# Patient Record
Sex: FEMALE | Race: BLACK OR AFRICAN AMERICAN | NOT HISPANIC OR LATINO | Employment: OTHER | URBAN - METROPOLITAN AREA
[De-identification: names, ages, dates, MRNs, and addresses within clinical notes are randomized per-mention and may not be internally consistent; named-entity substitution may affect disease eponyms.]

---

## 2023-05-05 ENCOUNTER — APPOINTMENT (EMERGENCY)
Dept: CT IMAGING | Facility: HOSPITAL | Age: 73
DRG: 641 | End: 2023-05-05
Payer: COMMERCIAL

## 2023-05-05 ENCOUNTER — APPOINTMENT (EMERGENCY)
Dept: RADIOLOGY | Facility: HOSPITAL | Age: 73
DRG: 641 | End: 2023-05-05
Payer: COMMERCIAL

## 2023-05-05 ENCOUNTER — HOSPITAL ENCOUNTER (INPATIENT)
Facility: HOSPITAL | Age: 73
LOS: 5 days | Discharge: HOME HEALTH | DRG: 641 | End: 2023-05-10
Attending: EMERGENCY MEDICINE | Admitting: STUDENT IN AN ORGANIZED HEALTH CARE EDUCATION/TRAINING PROGRAM
Payer: COMMERCIAL

## 2023-05-05 DIAGNOSIS — N18.9 ACUTE KIDNEY INJURY SUPERIMPOSED ON CHRONIC KIDNEY DISEASE (HCC): Primary | ICD-10-CM

## 2023-05-05 DIAGNOSIS — E87.5 HYPERKALEMIA: ICD-10-CM
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
## 2023-05-05 DIAGNOSIS — N17.9 ACUTE KIDNEY INJURY SUPERIMPOSED ON CHRONIC KIDNEY DISEASE (HCC): Primary | ICD-10-CM
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
## 2023-05-05 DIAGNOSIS — R26.2 AMBULATORY DYSFUNCTION: ICD-10-CM

## 2023-05-05 PROBLEM — E11.9 DM (DIABETES MELLITUS) (HCC): Status: ACTIVE | Noted: 2023-05-05

## 2023-05-05 PROBLEM — L73.2 HIDRADENITIS: Status: ACTIVE | Noted: 2023-05-05

## 2023-05-05 PROBLEM — N61.0 MASTITIS: Status: ACTIVE | Noted: 2023-05-05

## 2023-05-05 PROBLEM — D64.9 ANEMIA: Status: ACTIVE | Noted: 2023-05-05

## 2023-05-05 LAB
ALBUMIN SERPL BCP-MCNC: 3.5 G/DL (ref 3.5–5)
ALP SERPL-CCNC: 108 U/L (ref 34–104)
ALT SERPL W P-5'-P-CCNC: 6 U/L (ref 7–52)
ANION GAP SERPL CALCULATED.3IONS-SCNC: 4 MMOL/L (ref 4–13)
ANION GAP SERPL CALCULATED.3IONS-SCNC: 4 MMOL/L (ref 4–13)
AST SERPL W P-5'-P-CCNC: 8 U/L (ref 13–39)
ATRIAL RATE: 78 BPM
BASOPHILS # BLD AUTO: 0.05 THOUSANDS/ÂΜL (ref 0–0.1)
BASOPHILS NFR BLD AUTO: 0 % (ref 0–1)
BILIRUB DIRECT SERPL-MCNC: 0 MG/DL (ref 0–0.2)
BILIRUB SERPL-MCNC: 0.28 MG/DL (ref 0.2–1)
BUN SERPL-MCNC: 34 MG/DL (ref 5–25)
BUN SERPL-MCNC: 36 MG/DL (ref 5–25)
CALCIUM SERPL-MCNC: 9.2 MG/DL (ref 8.4–10.2)
CALCIUM SERPL-MCNC: 9.2 MG/DL (ref 8.4–10.2)
CARDIAC TROPONIN I PNL SERPL HS: <2 NG/L
CHLORIDE SERPL-SCNC: 107 MMOL/L (ref 96–108)
CHLORIDE SERPL-SCNC: 109 MMOL/L (ref 96–108)
CO2 SERPL-SCNC: 23 MMOL/L (ref 21–32)
CO2 SERPL-SCNC: 24 MMOL/L (ref 21–32)
CREAT SERPL-MCNC: 2.28 MG/DL (ref 0.6–1.3)
CREAT SERPL-MCNC: 2.65 MG/DL (ref 0.6–1.3)
EOSINOPHIL # BLD AUTO: 1.03 THOUSAND/ÂΜL (ref 0–0.61)
EOSINOPHIL NFR BLD AUTO: 8 % (ref 0–6)
ERYTHROCYTE [DISTWIDTH] IN BLOOD BY AUTOMATED COUNT: 16.6 % (ref 11.6–15.1)
GFR SERPL CREATININE-BSD FRML MDRD: 17 ML/MIN/1.73SQ M
GFR SERPL CREATININE-BSD FRML MDRD: 21 ML/MIN/1.73SQ M
GLUCOSE SERPL-MCNC: 78 MG/DL (ref 65–140)
GLUCOSE SERPL-MCNC: 87 MG/DL (ref 65–140)
GLUCOSE SERPL-MCNC: 89 MG/DL (ref 65–140)
HCT VFR BLD AUTO: 35.1 % (ref 34.8–46.1)
HGB BLD-MCNC: 10.8 G/DL (ref 11.5–15.4)
IMM GRANULOCYTES # BLD AUTO: 0.03 THOUSAND/UL (ref 0–0.2)
IMM GRANULOCYTES NFR BLD AUTO: 0 % (ref 0–2)
LYMPHOCYTES # BLD AUTO: 1.36 THOUSANDS/ÂΜL (ref 0.6–4.47)
LYMPHOCYTES NFR BLD AUTO: 11 % (ref 14–44)
MAGNESIUM SERPL-MCNC: 2.3 MG/DL (ref 1.9–2.7)
MCH RBC QN AUTO: 25.4 PG (ref 26.8–34.3)
MCHC RBC AUTO-ENTMCNC: 30.8 G/DL (ref 31.4–37.4)
MCV RBC AUTO: 83 FL (ref 82–98)
MONOCYTES # BLD AUTO: 0.82 THOUSAND/ÂΜL (ref 0.17–1.22)
MONOCYTES NFR BLD AUTO: 7 % (ref 4–12)
NEUTROPHILS # BLD AUTO: 9.06 THOUSANDS/ÂΜL (ref 1.85–7.62)
NEUTS SEG NFR BLD AUTO: 74 % (ref 43–75)
NRBC BLD AUTO-RTO: 0 /100 WBCS
P AXIS: 63 DEGREES
PLATELET # BLD AUTO: 328 THOUSANDS/UL (ref 149–390)
PMV BLD AUTO: 10.2 FL (ref 8.9–12.7)
POTASSIUM SERPL-SCNC: 5.4 MMOL/L (ref 3.5–5.3)
POTASSIUM SERPL-SCNC: 5.6 MMOL/L (ref 3.5–5.3)
PR INTERVAL: 158 MS
PROT SERPL-MCNC: 8.1 G/DL (ref 6.4–8.4)
QRS AXIS: 5 DEGREES
QRSD INTERVAL: 76 MS
QT INTERVAL: 356 MS
QTC INTERVAL: 405 MS
RBC # BLD AUTO: 4.25 MILLION/UL (ref 3.81–5.12)
SODIUM SERPL-SCNC: 135 MMOL/L (ref 135–147)
SODIUM SERPL-SCNC: 136 MMOL/L (ref 135–147)
T WAVE AXIS: 83 DEGREES
TSH SERPL DL<=0.05 MIU/L-ACNC: 5.87 UIU/ML (ref 0.45–4.5)
VENTRICULAR RATE: 78 BPM
WBC # BLD AUTO: 12.35 THOUSAND/UL (ref 4.31–10.16)

## 2023-05-05 PROCEDURE — 82948 REAGENT STRIP/BLOOD GLUCOSE: CPT

## 2023-05-05 PROCEDURE — 80076 HEPATIC FUNCTION PANEL: CPT | Performed by: EMERGENCY MEDICINE

## 2023-05-05 PROCEDURE — 83735 ASSAY OF MAGNESIUM: CPT | Performed by: EMERGENCY MEDICINE

## 2023-05-05 PROCEDURE — 99285 EMERGENCY DEPT VISIT HI MDM: CPT | Performed by: EMERGENCY MEDICINE

## 2023-05-05 PROCEDURE — G1004 CDSM NDSC: HCPCS

## 2023-05-05 PROCEDURE — 85025 COMPLETE CBC W/AUTO DIFF WBC: CPT | Performed by: EMERGENCY MEDICINE

## 2023-05-05 PROCEDURE — 84443 ASSAY THYROID STIM HORMONE: CPT | Performed by: EMERGENCY MEDICINE

## 2023-05-05 PROCEDURE — 70450 CT HEAD/BRAIN W/O DYE: CPT

## 2023-05-05 PROCEDURE — 80048 BASIC METABOLIC PNL TOTAL CA: CPT | Performed by: STUDENT IN AN ORGANIZED HEALTH CARE EDUCATION/TRAINING PROGRAM

## 2023-05-05 PROCEDURE — 36415 COLL VENOUS BLD VENIPUNCTURE: CPT | Performed by: EMERGENCY MEDICINE

## 2023-05-05 PROCEDURE — 1124F ACP DISCUSS-NO DSCNMKR DOCD: CPT | Performed by: INTERNAL MEDICINE

## 2023-05-05 PROCEDURE — 99285 EMERGENCY DEPT VISIT HI MDM: CPT

## 2023-05-05 PROCEDURE — 80048 BASIC METABOLIC PNL TOTAL CA: CPT | Performed by: EMERGENCY MEDICINE

## 2023-05-05 PROCEDURE — 93005 ELECTROCARDIOGRAM TRACING: CPT

## 2023-05-05 PROCEDURE — 93010 ELECTROCARDIOGRAM REPORT: CPT | Performed by: INTERNAL MEDICINE

## 2023-05-05 PROCEDURE — 84484 ASSAY OF TROPONIN QUANT: CPT | Performed by: EMERGENCY MEDICINE

## 2023-05-05 PROCEDURE — 99223 1ST HOSP IP/OBS HIGH 75: CPT | Performed by: STUDENT IN AN ORGANIZED HEALTH CARE EDUCATION/TRAINING PROGRAM

## 2023-05-05 PROCEDURE — 96365 THER/PROPH/DIAG IV INF INIT: CPT

## 2023-05-05 PROCEDURE — 71046 X-RAY EXAM CHEST 2 VIEWS: CPT

## 2023-05-05 RX ORDER — DULOXETIN HYDROCHLORIDE 60 MG/1
60 CAPSULE, DELAYED RELEASE ORAL DAILY
COMMUNITY

## 2023-05-05 RX ORDER — OMEPRAZOLE 20 MG/1
20 CAPSULE, DELAYED RELEASE ORAL DAILY
COMMUNITY

## 2023-05-05 RX ORDER — DULOXETIN HYDROCHLORIDE 60 MG/1
60 CAPSULE, DELAYED RELEASE ORAL DAILY
Status: DISCONTINUED | OUTPATIENT
Start: 2023-05-06 | End: 2023-05-10 | Stop reason: HOSPADM

## 2023-05-05 RX ORDER — INSULIN LISPRO 100 [IU]/ML
1-5 INJECTION, SOLUTION INTRAVENOUS; SUBCUTANEOUS
Status: DISCONTINUED | OUTPATIENT
Start: 2023-05-05 | End: 2023-05-10 | Stop reason: HOSPADM

## 2023-05-05 RX ORDER — AMITRIPTYLINE HYDROCHLORIDE 25 MG/1
25 TABLET, FILM COATED ORAL
COMMUNITY

## 2023-05-05 RX ORDER — TIMOLOL MALEATE 5 MG/ML
1 SOLUTION/ DROPS OPHTHALMIC 2 TIMES DAILY
Status: DISCONTINUED | OUTPATIENT
Start: 2023-05-05 | End: 2023-05-10 | Stop reason: HOSPADM

## 2023-05-05 RX ORDER — SULFAMETHOXAZOLE AND TRIMETHOPRIM 800; 160 MG/1; MG/1
1 TABLET ORAL EVERY 12 HOURS SCHEDULED
COMMUNITY
End: 2023-05-10

## 2023-05-05 RX ORDER — LATANOPROST 50 UG/ML
1 SOLUTION/ DROPS OPHTHALMIC 2 TIMES DAILY
Status: DISCONTINUED | OUTPATIENT
Start: 2023-05-05 | End: 2023-05-10 | Stop reason: HOSPADM

## 2023-05-05 RX ORDER — SODIUM CHLORIDE 9 MG/ML
100 INJECTION, SOLUTION INTRAVENOUS CONTINUOUS
Status: DISCONTINUED | OUTPATIENT
Start: 2023-05-05 | End: 2023-05-06

## 2023-05-05 RX ORDER — TRAMADOL HYDROCHLORIDE 50 MG/1
50 TABLET ORAL EVERY 6 HOURS PRN
Status: DISCONTINUED | OUTPATIENT
Start: 2023-05-05 | End: 2023-05-06

## 2023-05-05 RX ORDER — TRAMADOL HYDROCHLORIDE 50 MG/1
50 TABLET ORAL EVERY 6 HOURS PRN
COMMUNITY

## 2023-05-05 RX ORDER — SODIUM POLYSTYRENE SULFONATE 4.1 MEQ/G
15 POWDER, FOR SUSPENSION ORAL; RECTAL ONCE
Status: COMPLETED | OUTPATIENT
Start: 2023-05-05 | End: 2023-05-05

## 2023-05-05 RX ORDER — ALBUTEROL SULFATE 90 UG/1
2 AEROSOL, METERED RESPIRATORY (INHALATION) EVERY 6 HOURS PRN
COMMUNITY

## 2023-05-05 RX ORDER — ASPIRIN 81 MG/1
81 TABLET, CHEWABLE ORAL DAILY
COMMUNITY

## 2023-05-05 RX ORDER — ACETAMINOPHEN 325 MG/1
650 TABLET ORAL EVERY 6 HOURS PRN
Status: DISCONTINUED | OUTPATIENT
Start: 2023-05-05 | End: 2023-05-10 | Stop reason: HOSPADM

## 2023-05-05 RX ORDER — GABAPENTIN 600 MG/1
600 TABLET ORAL 3 TIMES DAILY
COMMUNITY

## 2023-05-05 RX ORDER — HEPARIN SODIUM 5000 [USP'U]/ML
5000 INJECTION, SOLUTION INTRAVENOUS; SUBCUTANEOUS EVERY 8 HOURS SCHEDULED
Status: DISCONTINUED | OUTPATIENT
Start: 2023-05-05 | End: 2023-05-10 | Stop reason: HOSPADM

## 2023-05-05 RX ORDER — ALBUTEROL SULFATE 90 UG/1
2 AEROSOL, METERED RESPIRATORY (INHALATION) EVERY 6 HOURS PRN
Status: DISCONTINUED | OUTPATIENT
Start: 2023-05-05 | End: 2023-05-10 | Stop reason: HOSPADM

## 2023-05-05 RX ORDER — OXYCODONE HYDROCHLORIDE 5 MG/1
5 TABLET ORAL EVERY 6 HOURS PRN
Status: DISCONTINUED | OUTPATIENT
Start: 2023-05-05 | End: 2023-05-06

## 2023-05-05 RX ORDER — FLUTICASONE PROPIONATE 50 MCG
1 SPRAY, SUSPENSION (ML) NASAL DAILY
COMMUNITY

## 2023-05-05 RX ORDER — FUROSEMIDE 20 MG/1
20 TABLET ORAL 2 TIMES DAILY
COMMUNITY

## 2023-05-05 RX ORDER — ASPIRIN 81 MG/1
81 TABLET, CHEWABLE ORAL DAILY
Status: DISCONTINUED | OUTPATIENT
Start: 2023-05-06 | End: 2023-05-10 | Stop reason: HOSPADM

## 2023-05-05 RX ORDER — PANTOPRAZOLE SODIUM 40 MG/1
40 TABLET, DELAYED RELEASE ORAL
Status: DISCONTINUED | OUTPATIENT
Start: 2023-05-06 | End: 2023-05-10 | Stop reason: HOSPADM

## 2023-05-05 RX ORDER — AMITRIPTYLINE HYDROCHLORIDE 25 MG/1
25 TABLET, FILM COATED ORAL
Status: DISCONTINUED | OUTPATIENT
Start: 2023-05-05 | End: 2023-05-10 | Stop reason: HOSPADM

## 2023-05-05 RX ORDER — LATANOPROST 50 UG/ML
1 SOLUTION/ DROPS OPHTHALMIC
COMMUNITY

## 2023-05-05 RX ORDER — AMLODIPINE BESYLATE 10 MG/1
10 TABLET ORAL DAILY
COMMUNITY

## 2023-05-05 RX ORDER — ENALAPRIL MALEATE 10 MG/1
10 TABLET ORAL DAILY
COMMUNITY
End: 2023-05-10

## 2023-05-05 RX ADMIN — ACETAMINOPHEN 650 MG: 325 TABLET ORAL at 22:12

## 2023-05-05 RX ADMIN — HEPARIN SODIUM 5000 UNITS: 5000 INJECTION INTRAVENOUS; SUBCUTANEOUS at 16:59

## 2023-05-05 RX ADMIN — HEPARIN SODIUM 5000 UNITS: 5000 INJECTION INTRAVENOUS; SUBCUTANEOUS at 21:35

## 2023-05-05 RX ADMIN — TRAMADOL HYDROCHLORIDE 50 MG: 50 TABLET, COATED ORAL at 16:59

## 2023-05-05 RX ADMIN — SODIUM CHLORIDE, SODIUM LACTATE, POTASSIUM CHLORIDE, AND CALCIUM CHLORIDE 1000 ML: .6; .31; .03; .02 INJECTION, SOLUTION INTRAVENOUS at 14:11

## 2023-05-05 RX ADMIN — SODIUM CHLORIDE 100 ML/HR: 0.9 INJECTION, SOLUTION INTRAVENOUS at 15:30

## 2023-05-05 RX ADMIN — SODIUM POLYSTYRENE SULFONATE 15 G: 1 POWDER ORAL; RECTAL at 22:14

## 2023-05-05 RX ADMIN — TIMOLOL MALEATE 1 DROP: 5 SOLUTION/ DROPS OPHTHALMIC at 21:35

## 2023-05-05 RX ADMIN — LATANOPROST 1 DROP: 50 SOLUTION OPHTHALMIC at 21:36

## 2023-05-05 NOTE — PLAN OF CARE
Problem: Potential for Falls  Goal: Patient will remain free of falls  Description: INTERVENTIONS:  - Educate patient/family on patient safety including physical limitations  - Instruct patient to call for assistance with activity   - Consult OT/PT to assist with strengthening/mobility   - Keep Call bell within reach  - Keep bed low and locked with side rails adjusted as appropriate  - Keep care items and personal belongings within reach  - Initiate and maintain comfort rounds  - Make Fall Risk Sign visible to staff  - Offer Toileting every 3 Hours, in advance of need  - Initiate/Maintain bed alarm  - Obtain necessary fall risk management equipment:   - Apply yellow socks and bracelet for high fall risk patients  - Consider moving patient to room near nurses station  5/5/2023 1808 by Santiago Chowdhury  Outcome: Progressing  5/5/2023 1808 by Santiago Chowdhury  Outcome: Progressing     Problem: MOBILITY - ADULT  Goal: Maintain or return to baseline ADL function  Description: INTERVENTIONS:  -  Assess patient's ability to carry out ADLs; assess patient's baseline for ADL function and identify physical deficits which impact ability to perform ADLs (bathing, care of mouth/teeth, toileting, grooming, dressing, etc )  - Assess/evaluate cause of self-care deficits   - Assess range of motion  - Assess patient's mobility; develop plan if impaired  - Assess patient's need for assistive devices and provide as appropriate  - Encourage maximum independence but intervene and supervise when necessary  - Involve family in performance of ADLs  - Assess for home care needs following discharge   - Consider OT consult to assist with ADL evaluation and planning for discharge  - Provide patient education as appropriate  5/5/2023 1808 by Santiago Chowdhury  Outcome: Progressing  5/5/2023 1808 by Santiago Chowdhury  Outcome: Progressing  Goal: Maintains/Returns to pre admission functional level  Description: INTERVENTIONS:  - Perform BMAT or MOVE assessment daily    - Set and communicate daily mobility goal to care team and patient/family/caregiver  - Collaborate with rehabilitation services on mobility goals if consulted  - Perform Range of Motion 3 times a day  - Reposition patient every 3 hours    - Dangle patient 3 times a day  - Stand patient 3 times a day  - Ambulate patient 3 times a day  - Out of bed to chair 3 times a day   - Out of bed for meals 3 times a day  - Out of bed for toileting  - Record patient progress and toleration of activity level   5/5/2023 1808 by Xavier Manriquez  Outcome: Progressing  5/5/2023 1808 by Xavier Manriquez  Outcome: Progressing     Problem: PAIN - ADULT  Goal: Verbalizes/displays adequate comfort level or baseline comfort level  Description: Interventions:  - Encourage patient to monitor pain and request assistance  - Assess pain using appropriate pain scale  - Administer analgesics based on type and severity of pain and evaluate response  - Implement non-pharmacological measures as appropriate and evaluate response  - Consider cultural and social influences on pain and pain management  - Notify physician/advanced practitioner if interventions unsuccessful or patient reports new pain  Outcome: Progressing     Problem: INFECTION - ADULT  Goal: Absence or prevention of progression during hospitalization  Description: INTERVENTIONS:  - Assess and monitor for signs and symptoms of infection  - Monitor lab/diagnostic results  - Monitor all insertion sites, i e  indwelling lines, tubes, and drains  - Monitor endotracheal if appropriate and nasal secretions for changes in amount and color  - [place] appropriate cooling/warming therapies per order  - Administer medications as ordered  - Instruct and encourage patient and family to use good hand hygiene technique  - Identify and instruct in appropriate isolation precautions for identified infection/condition  Outcome: Progressing  Goal: Absence of fever/infection during neutropenic period  Description: INTERVENTIONS:  - Monitor WBC    Outcome: Progressing     Problem: SAFETY ADULT  Goal: Patient will remain free of falls  Description: INTERVENTIONS:  - Educate patient/family on patient safety including physical limitations  - Instruct patient to call for assistance with activity   - Consult OT/PT to assist with strengthening/mobility   - Keep Call bell within reach  - Keep bed low and locked with side rails adjusted as appropriate  - Keep care items and personal belongings within reach  - Initiate and maintain comfort rounds  - Make Fall Risk Sign visible to staff  - Offer Toileting every 3 Hours, in advance of need  - Initiate/Maintain bed alarm  - Obtain necessary fall risk management equipment  - Apply yellow socks and bracelet for high fall risk patients  - Consider moving patient to room near nurses station  5/5/2023 1808 by Nabil Rodriguez  Outcome: Progressing  5/5/2023 1808 by Nabil Rodriguez  Outcome: Progressing  Goal: Maintain or return to baseline ADL function  Description: INTERVENTIONS:  -  Assess patient's ability to carry out ADLs; assess patient's baseline for ADL function and identify physical deficits which impact ability to perform ADLs (bathing, care of mouth/teeth, toileting, grooming, dressing, etc )  - Assess/evaluate cause of self-care deficits   - Assess range of motion  - Assess patient's mobility; develop plan if impaired  - Assess patient's need for assistive devices and provide as appropriate  - Encourage maximum independence but intervene and supervise when necessary  - Involve family in performance of ADLs  - Assess for home care needs following discharge   - Consider OT consult to assist with ADL evaluation and planning for discharge  - Provide patient education as appropriate  5/5/2023 1808 by Nabil Rodriguez  Outcome: Progressing  5/5/2023 1808 by Nabil Rodriguez  Outcome: Progressing  Goal: Maintains/Returns to pre admission functional level  Description: INTERVENTIONS:  - Perform BMAT or MOVE assessment daily    - Set and communicate daily mobility goal to care team and patient/family/caregiver  - Collaborate with rehabilitation services on mobility goals if consulted  - Perform Range of Motion 3 times a day  - Reposition patient every 3 hours  - Dangle patient 3 times a day  - Stand patient 3 times a day  - Ambulate patient 3 times a day  - Out of bed to chair 3 times a day   - Out of bed for meals 3 times a day  - Out of bed for toileting  - Record patient progress and toleration of activity level   5/5/2023 1808 by Jerald Dickens  Outcome: Progressing  5/5/2023 1808 by Jerald Dickens  Outcome: Progressing     Problem: DISCHARGE PLANNING  Goal: Discharge to home or other facility with appropriate resources  Description: INTERVENTIONS:  - Identify barriers to discharge w/patient and caregiver  - Arrange for needed discharge resources and transportation as appropriate  - Identify discharge learning needs (meds, wound care, etc )  - Arrange for interpretive services to assist at discharge as needed  - Refer to Case Management Department for coordinating discharge planning if the patient needs post-hospital services based on physician/advanced practitioner order or complex needs related to functional status, cognitive ability, or social support system  Outcome: Progressing     Problem: Knowledge Deficit  Goal: Patient/family/caregiver demonstrates understanding of disease process, treatment plan, medications, and discharge instructions  Description: Complete learning assessment and assess knowledge base    Interventions:  - Provide teaching at level of understanding  - Provide teaching via preferred learning methods  Outcome: Progressing

## 2023-05-05 NOTE — ASSESSMENT & PLAN NOTE
Lab Results   Component Value Date    HGBA1C 6 1 (H) 03/23/2023       No results for input(s): POCGLU in the last 72 hours  Blood Sugar Average: Last 72 hrs:     Present on admission history of diabetes  Most recent A1c 6 1  Patient is on once weekly Ozempic  Patient is requesting for regular diet and not diabetic diet since her A1c is controlled which is not unreasonable  Continue with Accu-Cheks monitoring and signs of coverage

## 2023-05-05 NOTE — Clinical Note
Case was discussed with Dr Shruthi Degroot and the patient's admission status was agreed to be Admission Status: observation status to the service of Dr Shruthi Degroot

## 2023-05-05 NOTE — ASSESSMENT & PLAN NOTE
Noted with anemia w/o signs of active overt bleeding repeat   EGD in 01/2023 noted with hiatal hernia, nonobstructive Schatzki's ring, erosive gastritis  Colonoscopy 01/2023 report noted with diverticulosis, small 2 to 5 mm polyp was removed at the time  Current hemoglobin stable 10 8  Follow-up with iron panel

## 2023-05-05 NOTE — ASSESSMENT & PLAN NOTE
Patient has history of left breast hidradenitis  Patient was started on Bactrim by PCP on 05/01/2023  Presented with acute kidney injury, hyperkalemia likely due to Bactrim  Examined with female chaperone ( Nurse Tyra at bedside)  Noted no signs of infection  Stop Bactrim  Follow-up with wound care consult

## 2023-05-05 NOTE — ASSESSMENT & PLAN NOTE
Reportedly patient had 2 falls most recent fall was yesterday  Patient reports few days ago she was in the bathroom and had lost her balance and of having a fall and hit her head against a doorknob  She also fell out of bed yesterday  Denies loss of consciousness  Patient does report having abdomen episodes of slurred speech  Current encounter patient appears comfortable nondistressed  Her speech is normal at this time during my encounter  Does report having difficulty with her memory lately  No gross focal motor deficit on my exam   Fall precaution  TSH elevated: Patient is not on any medications    F/u with repeat TSH in AM  Follow-up vitamin D level, A93 level, folic acid level  PT OT lee ann

## 2023-05-05 NOTE — ED PROVIDER NOTES
"History  Chief Complaint   Patient presents with   • Fall     Per pts daughter pt fell yesterday and hit her head on a door knob  Pt again fell out of the bed but denies injury or hitting her head  (+) Asprin  Pts daughter also states her mother has had slurred speech and has been having trouble finding her words and has been off balance  Daughter states she believes this started after pt began taking \"gabapentin\"  HPI    Prior to Admission Medications   Prescriptions Last Dose Informant Patient Reported? Taking?    DULoxetine (CYMBALTA) 60 mg delayed release capsule 2023  Yes Yes   Sig: Take 60 mg by mouth daily   albuterol (PROVENTIL HFA,VENTOLIN HFA) 90 mcg/act inhaler 2023  Yes Yes   Sig: Inhale 2 puffs every 6 (six) hours as needed for wheezing   amLODIPine (NORVASC) 10 mg tablet 2023  Yes Yes   Sig: Take 10 mg by mouth daily   amitriptyline (ELAVIL) 25 mg tablet 2023  Yes Yes   Sig: Take 25 mg by mouth daily at bedtime   aspirin 81 mg chewable tablet 2023  Yes Yes   Sig: Chew 81 mg daily   enalapril (VASOTEC) 10 mg tablet 2023  Yes Yes   Sig: Take 10 mg by mouth daily   fluticasone (FLONASE) 50 mcg/act nasal spray 2023  Yes Yes   Si spray into each nostril daily   furosemide (LASIX) 20 mg tablet 2023  Yes Yes   Sig: Take 20 mg by mouth 2 (two) times a day   gabapentin (NEURONTIN) 600 MG tablet 2023  Yes Yes   Sig: Take 600 mg by mouth 3 (three) times a day   latanoprost (XALATAN) 0 005 % ophthalmic solution 2023  Yes Yes   Si drop daily at bedtime   omeprazole (PriLOSEC) 20 mg delayed release capsule 2023  Yes Yes   Sig: Take 20 mg by mouth daily   sulfamethoxazole-trimethoprim (BACTRIM DS) 800-160 mg per tablet 2023  Yes Yes   Sig: Take 1 tablet by mouth every 12 (twelve) hours   timolol (BETIMOL) 0 5 % ophthalmic solution 2023  Yes Yes   Si drop 2 (two) times a day   traMADol (ULTRAM) 50 mg tablet 2023  Yes Yes   Sig: Take 50 mg by mouth " every 6 (six) hours as needed for moderate pain      Facility-Administered Medications: None       Past Medical History:   Diagnosis Date   • Diabetes ([facility] )    • Hypertension    • Sciatica    • Stage 4 chronic kidney disease ([facility] )        History reviewed  No pertinent surgical history  History reviewed  No pertinent family history  I have reviewed and agree with the history as documented  E-Cigarette/Vaping   • E-Cigarette Use Never User      E-Cigarette/Vaping Substances   • Nicotine No    • THC No    • CBD No    • Flavoring No    • Other No    • Unknown No      Social History     Tobacco Use   • Smoking status: Never   • Smokeless tobacco: Never   Vaping Use   • Vaping Use: Never used   Substance Use Topics   • Alcohol use: Not Currently   • Drug use: Yes     Types: Marijuana       Review of Systems    Physical Exam  Physical Exam  Vitals and nursing note reviewed  Constitutional:       General: She is not in acute distress  Appearance: She is well-developed  She is morbidly obese  HENT:      Head: Normocephalic and atraumatic  No raccoon eyes, Ledesma's sign, abrasion, contusion or laceration  Eyes:      Conjunctiva/sclera: Conjunctivae normal       Pupils: Pupils are equal, round, and reactive to light  Neck:      Trachea: No tracheal deviation  Cardiovascular:      Rate and Rhythm: Normal rate and regular rhythm  Heart sounds: Normal heart sounds  Pulmonary:      Effort: Pulmonary effort is normal  No respiratory distress  Breath sounds: Normal breath sounds  Chest:      Chest wall: No tenderness  Abdominal:      General: There is no distension  Palpations: Abdomen is soft  Tenderness: There is no abdominal tenderness  Musculoskeletal:         General: No swelling, tenderness, deformity or signs of injury  Cervical back: Normal range of motion  No spinous process tenderness or muscular tenderness  Normal range of motion        Right lower le+ Pitting Edema present  Left lower le+ Pitting Edema present  Skin:     General: Skin is warm and dry  Neurological:      General: No focal deficit present  Mental Status: She is alert and oriented to person, place, and time  GCS: GCS eye subscore is 4  GCS verbal subscore is 5  GCS motor subscore is 6  Cranial Nerves: No dysarthria or facial asymmetry  Sensory: Sensation is intact  Motor: Weakness (mild, generalized) and tremor (occasionally noted to bilateral arms at rest) present        Coordination: Finger-Nose-Finger Test and Heel to [facility] Test normal    Psychiatric:         Behavior: Behavior normal          Vital Signs  ED Triage Vitals   Temperature Pulse Respirations Blood Pressure SpO2   23 1222 23 1222 23 1222 23 1222 23 1222   98 8 °F (37 1 °C) 94 19 144/81 97 %      Temp Source Heart Rate Source Patient Position - Orthostatic VS BP Location FiO2 (%)   23 1222 23 1222 23 1222 23 1222 --   Oral Monitor Sitting Left arm       Pain Score       23 1553       10 - Worst Possible Pain           Vitals:    23 0306 23 0738 23 1501 23 1511   BP:  139/76 91/55 101/58   Pulse: 86  71 62   Patient Position - Orthostatic VS:             Visual Acuity      ED Medications  Medications   lactated ringers bolus 1,000 mL (1,000 mL Intravenous New Bag 23 1411)       Diagnostic Studies  Results Reviewed     Procedure Component Value Units Date/Time    UA w Reflex to Microscopic w Reflex to Culture [[id number]]     Lab Status: No result Specimen: Urine     TSH [[id number]]  (Abnormal) Collected: 23 125    Lab Status: Final result Specimen: Blood from Arm, Right Updated: 23 1350     TSH 3RD GENERATON 5 873 uIU/mL     Basic metabolic panel [[id number]]  (Abnormal) Collected: 23 125    Lab Status: Final result Specimen: Blood from Arm, Right Updated: 23 1348     Sodium 135 mmol/L      Potassium 5 6 mmol/L      Chloride 107 mmol/L      CO2 24 mmol/L      ANION GAP 4 mmol/L      BUN 36 mg/dL      Creatinine 2 65 mg/dL      Glucose 87 mg/dL      Calcium 9 2 mg/dL      eGFR 17 ml/min/1 73sq m     Narrative:      National Kid[employer] Disease Foundation guidelines for Chronic Kidney Disease (CKD):   •  Stage 1 with normal or high GFR (GFR > 90 mL/min/1 73 square meters)  •  Stage 2 Mild CKD (GFR = 60-89 mL/min/1 73 square meters)  •  Stage 3A Moderate CKD (GFR = 45-59 mL/min/1 73 square meters)  •  Stage 3B Moderate CKD (GFR = 30-44 mL/min/1 73 square meters)  •  Stage 4 Severe CKD (GFR = 15-29 mL/min/1 73 square meters)  •  Stage 5 End Stage CKD (GFR <15 mL/min/1 73 square meters)  Note: GFR calculation is accurate only with a steady state creatinine    Hepatic function panel [[id number]]  (Abnormal) Collected: 05/05/23 1255    Lab Status: Final result Specimen: Blood from Arm, Right Updated: 05/05/23 1348     Total Bilirubin 0 28 mg/dL      Bilirubin, Direct 0 00 mg/dL      Alkaline Phosphatase 108 U/L      AST 8 U/L      ALT 6 U/L      Total Protein 8 1 g/dL      Albumin 3 5 g/dL     Magnesium [[id number]]  (Normal) Collected: 05/05/23 1255    Lab Status: Final result Specimen: Blood from Arm, Right Updated: 05/05/23 1348     Magnesium 2 3 mg/dL     HS Troponin 0hr (reflex protocol) [[id number]]  (Normal) Collected: 05/05/23 1258    Lab Status: Final result Specimen: Blood from Arm, Right Updated: 05/05/23 1342     hs TnI 0hr <2 ng/L     CBC and differential [[id number]]  (Abnormal) Collected: 05/05/23 1255    Lab Status: Final result Specimen: Blood from Arm, Right Updated: 05/05/23 1306     WBC 12 35 Thousand/uL      RBC 4 25 Million/uL      Hemoglobin 10 8 g/dL      Hematocrit 35 1 %      MCV 83 fL      MCH 25 4 pg      MCHC 30 8 g/dL      RDW 16 6 %      MPV 10 2 fL      Platelets 969 Thousands/uL      nRBC 0 /100 WBCs      Neutrophils Relative 74 %      Immat GRANS % 0 %      Lymphocytes Relative 11 %      Monocytes Relative 7 %      Eosinophils Relative 8 %      Basophils Relative 0 %      Neutrophils Absolute 9 06 Thousands/µL      Immature Grans Absolute 0 03 Thousand/uL      Lymphocytes Absolute 1 36 Thousands/µL      Monocytes Absolute 0 82 Thousand/µL      Eosinophils Absolute 1 03 Thousand/µL      Basophils Absolute 0 05 Thousands/µL                  XR chest 2 views   Final Result by Declan Lockhart MD (05/05 1451)      No acute cardiopulmonary disease  Workstation performed: [id number]         CT head without contrast   Final Result by Rachell Correa MD (05/05 0102)      No acute intracranial process  No skull fracture  Chronic microangiopathy  Workstation performed: [id number]                    Procedures  ECG 12 Lead Documentation Only    Date/Time: 5/5/2023 1:03 PM  Performed by: Brooks De La O MD  Authorized by: Brooks De La O MD     Indications / Diagnosis:  Confusion, tremors  ECG reviewed by me, the ED Provider: yes    Patient location:  ED  Previous ECG:     Previous ECG:  Unavailable  Interpretation:     Interpretation: abnormal    Rate:     ECG rate:  78    ECG rate assessment: normal    Rhythm:     Rhythm: sinus rhythm    Ectopy:     Ectopy: none    QRS:     QRS axis:  Normal    QRS intervals:  Normal  Conduction:     Conduction: normal    ST segments:     ST segments:  Normal  T waves:     T waves: flattening      Flattening:  I, II, aVL, aVF, V3 and V4             ED Course                               SBIRT 22yo+    Flowsheet Row Most Recent Value   Initial Alcohol Screen: US AUDIT-C     1  How often do you have a drink containing alcohol? 0 Filed at: 05/05/2023 1316   2  How many drinks containing alcohol do you have on a typical day you are drinking? 0 Filed at: 05/05/2023 1316   3b  FEMALE Any Age, or MALE 65+: How often do you have 4 or more drinks on one occassion?  0 Filed at: 05/05/2023 1316   Audit-C Score 0 Filed at: 05/05/2023 1316   CRISTOBAL: How many times in the past year have you    Used an illegal drug or used a prescription medication for non-medical reasons? Never Filed at: 05/05/2023 1316                    Medical Decision Making  This is a 47-year-old female who presents here today for evaluation after a fall  She says yesterday she was sitting on her shower stool and towards the end of her shower felt her legs were asleep  She was able to get out of the shower okay, however when reaching up to  her towel lost her balance and fell over  She says she hit her head on a door handle  She denies loss of consciousness  She has mild headache at the site of impact  She takes baby aspirin, but no other blood thinning medications  She says later she was applying the stepstool with her left foot to try and get into her bed when she started having some tremoring of her left leg, lost her balance, and fell over backwards  She denies any injuries from that fall  She says for several weeks she has been having tremors, starting in her arms and progressing to her legs  She says it is bilateral, still worse in the arms than the legs, somewhat intermittent, particularly in her legs  She denies any focal weakness or numbness  She denies any headaches  She does endorse problems with feeling like she is having some slurred speech at times, as well as short-term memory loss and word finding issues  She says this has been waxing and waning for several weeks  She attributes onset of memory issues and tremors to starting gabapentin  She says she was on this years ago and does not feel like it was helpful  She has not had any recent dose adjustments  She says it was started on a single dose and not titrated up by her doctor  She was started on tramadol for pain related to flareup of hidradenitis on her breast and chronic pain issues  She was also started on an antibiotic    She denies "any medication changes or over-the-counter medication use  She denies fevers, URI symptoms, nausea, vomiting, diarrhea, chest pain or palpitations  She does feel like she has been \"wheezing\" recently and was given albuterol by her PCP  She denies shortness of breath or known history of COPD or asthma  She has a remote history of smoking  From review of her chart, she was prescribed gabapentin on 2/8/2023, to take 600 mg once daily  There is a phone note from 4/3/2023 where she called with shaking of hands, feeling off balance, and some speech issues ongoing since February    ROS: Otherwise negative, unless stated as above  She is well appearing, in no acute distress  She has no external signs of trauma on exam  She has mild generalized weakness, but no focal deficits  She is noted to have intermittent tremors of her bilateral upper extremities, but more noticeable at rest, and less with use  She has no noticeable tremor of her bilateral legs  She has 1+ BLE edema  Exam is otherwise unremarkable  We will get a CT scan of her head to evaluate for intracranial hemorrhage in the setting of her two falls with head trauma  She has no other areas of pain or signs of trauma on exam to warrant additional imaging at this time  She blames her balance issues on the medications, and temporally this could be true  However, her symptoms could also be due to dehydration, LEOLA, electrolyte or thyroid abnormalities, ACS, dysrhythmia, less likely infection  We will check lab work and CXR to evaluate  CT scan will also show a large mass or space occupying lesion that could be contributing to neurologic symptoms  CT scan shows no acute abnormalities  CXR was reviewed by myself, and is unremarkable  Cr today is 2 65  Her prior baseline seems to be in the 1 6-1 7 range, though was 1 95 on 3/23  She has a mild hyperkalemia of 5 6 with no EKG changes  Mild leukocytosis and anemia are similar to prior   Labs are otherwise " unremarkable  She will be admitted to the hospital for further management of her LEOLA  I updated the patient, as well as her daughter via phone, on findings and plan of care, and she expresses understanding  Acute kidney injury superimposed on chronic kidney disease ([facility] ): acute illness or injury  Fall in home, initial encounter: acute illness or injury  Hyperkalemia: acute illness or injury  Amount and/or Complexity of Data Reviewed  External Data Reviewed: labs and notes  Labs: ordered  Decision-making details documented in ED Course  Radiology: ordered and independent interpretation performed  Decision-making details documented in ED Course  ECG/medicine tests: ordered and independent interpretation performed  Decision-making details documented in ED Course  Risk  Decision regarding hospitalization  Disposition  Final diagnoses:   Acute kidney injury superimposed on chronic kidney disease ([facility] )   Hyperkalemia   Fall in home, initial encounter     Time reflects when diagnosis was documented in both MDM as applicable and the Disposition within this note     Time User Action Codes Description Comment    5/5/2023  2:18 PM Aldair Davila Ruts Add [N17 9,  N18 9] Acute kidney injury superimposed on chronic kidney disease ([facility] )     5/5/2023  2:18 PM Tesha-Teri Straussibal Ruts Add [E87 5] Hyperkalemia     5/5/2023  2:45 PM Tesha-Aldair Strauss Ruts Add [H59  [place],  F21 851] Fall in home, initial encounter       ED Disposition     ED Disposition   Admit    Condition   Stable    Date/Time   Fri May 5, 2023  2:45 PM    Comment   Case was discussed with Dr Julio César Menditea and the patient's admission status was agreed to be Admission Status: inpatient status to the service of Dr Julio César Mendieta            Follow-up Information    None         Current Discharge Medication List      CONTINUE these medications which have NOT CHANGED    Details   albuterol (PROVENTIL HFA,VENTOLIN HFA) 90 mcg/act inhaler Inhale 2 puffs every 6 (six) hours as needed for wheezing      amitriptyline (ELAVIL) 25 mg tablet Take 25 mg by mouth daily at bedtime      amLODIPine (NORVASC) 10 mg tablet Take 10 mg by mouth daily      aspirin 81 mg chewable tablet Chew 81 mg daily      DULoxetine (CYMBALTA) 60 mg delayed release capsule Take 60 mg by mouth daily      enalapril (VASOTEC) 10 mg tablet Take 10 mg by mouth daily      fluticasone (FLONASE) 50 mcg/act nasal spray 1 spray into each nostril daily      furosemide (LASIX) 20 mg tablet Take 20 mg by mouth 2 (two) times a day      gabapentin (NEURONTIN) 600 MG tablet Take 600 mg by mouth 3 (three) times a day      latanoprost (XALATAN) 0 005 % ophthalmic solution 1 drop daily at bedtime      omeprazole (PriLOSEC) 20 mg delayed release capsule Take 20 mg by mouth daily      sulfamethoxazole-trimethoprim (BACTRIM DS) 800-160 mg per tablet Take 1 tablet by mouth every 12 (twelve) hours      timolol (BETIMOL) 0 5 % ophthalmic solution 1 drop 2 (two) times a day      traMADol (ULTRAM) 50 mg tablet Take 50 mg by mouth every 6 (six) hours as needed for moderate pain             No discharge procedures on file      PDMP Review       Value Time User    PDMP Reviewed  Yes 5/5/2023  4:16 PM Parisa Alvarado MD          ED Provider  Electronically Signed by           Dayanara Rod MD  05/07/23 [id number]

## 2023-05-05 NOTE — H&P
[address] [place] [address]  H&P  Name: Alan Franco 81 y o  female I MRN: [id number]  Unit/Bed#: -01 I Date of Admission: 5/5/2023   Date of Service: 5/5/2023 I Hospital Day: 0      Assessment/Plan   * Acute kidney injury superimposed on chronic kidney disease [facility])  Assessment & Plan  68-year-old female patient with past medical history of CKD stage III, presented to [place] [address] emergency room after sustaining a mechanical fall x 2 at home yesterday and few days ago  On further evaluation noted with elevated creatinine from her baseline and noted with hyperkalemia  Baseline ranges around 1 6-1 7 range  Currently on presentation 2 65  Hyperkalemia 5 6  Elevated TSH    Suspected secondary to recently started Bactrim on 05/01/2023 for mastitis  On my exam no signs of mastitis noted  Patient has chronic left breast hidradenitis  Follow-up with wound care consult  Stop Bactrim for now  Continue IV fluids  Holding home dose of Norvasc, enalapril, Lasix due to LEOLA as well as blood pressure controlled  Monitor BMP  F/u with repeat TSH in Am  Avoid nephrotoxic agent  Fall  Assessment & Plan  Reportedly patient had 2 falls most recent fall was yesterday  Patient reports few days ago she was in the bathroom and had lost her balance and of having a fall and hit her head against a doorknob  She also fell out of bed yesterday  Denies loss of consciousness  Patient does report having abdomen episodes of slurred speech  Current encounter patient appears comfortable nondistressed  Her speech is normal at this time during my encounter  Does report having difficulty with her memory lately  No gross focal motor deficit on my exam   Fall precaution  TSH elevated: Patient is not on any medications    F/u with repeat TSH in AM  Follow-up vitamin D level, Q48 level, folic acid level  PT OT eval     Anemia  Assessment & Plan  Noted with anemia w/o signs of active overt bleeding repeat   EGD in 01/2023 noted with hiatal hernia, nonobstructive Schatzki's ring, erosive gastritis  Colonoscopy 01/2023 report noted with diverticulosis, small 2 to 5 mm polyp was removed at the time  Current hemoglobin stable 10 8  Follow-up with iron panel  DM (diabetes mellitus) ([facility] [address] )  Assessment & Plan  Lab Results   Component Value Date    HGBA1C 6 1 (H) 03/23/2023       No results for input(s): POCGLU in the last 72 hours  Blood Sugar Average: Last 72 hrs:     Present on admission history of diabetes  Most recent A1c 6 1  Patient is on once weekly Ozempic  Patient is requesting for regular diet and not diabetic diet since her A1c is controlled which is not unreasonable  Continue with Accu-Cheks monitoring and signs of coverage  Hidradenitis  Assessment & Plan  Patient has history of left breast hidradenitis  Patient was started on Bactrim by PCP on 05/01/2023  Presented with acute kidney injury, hyperkalemia likely due to Bactrim  Examined with female chaperone ( Nurse Mary at bedside)  Noted no signs of infection  Stop Bactrim  Follow-up with wound care consult  Hyperkalemia  Assessment & Plan  Presented with hyperkalemia, acute kidney injury CKD  Suspected secondary to Bactrim  EKG noted with normal sinus rhythm without acute changes  Continue with IV fluids  Holding home dose of enalapril  Monitor BMP  Continue telemetry monitoring  VTE Pharmacologic Prophylaxis: VTE Score: 4 Moderate Risk (Score 3-4) - Pharmacological DVT Prophylaxis Ordered: heparin  Code Status: Level 1 - Full Code   Discussion with family: Updated  (daughter) at bedside  Anticipated Length of Stay: Patient will be admitted on an inpatient basis with an anticipated length of stay of greater than 2 midnights secondary to Acute kidney injury on CKD, hyperkalemia      Total Time Spent on Date of Encounter in care of patient: 85 minutes This time was spent on one or more of the following: performing physical exam; counseling and coordination of care; obtaining or reviewing history; documenting in the medical record; reviewing/ordering tests, medications or procedures; communicating with other healthcare professionals and discussing with patient's family/caregivers  Chief Complaint: Mechanical fall x2, ambulate dysfunction  History of Present Illness:    Lidia Mendez is a 81 y o  female with a PMH of diabetes, CKD stage III, spinal stenosis of lumbar region, anemia, insomnia , depression, difficulty with memories at times, chronic left breast hidradenitis, patient was recently started on Bactrim for presumed left breast mastitis on 05/01/2023 who presents to [place] [address] emergency room after sustaining 2 falls at home, most recent was yesterday and complained of hitting her head without loss of consciousness  2 days ago patient was in the shower and was trying to hang a towel over and up losing her balance causing a fall and she ended up eating her head to the doorknob  reportedly fell out of the bed but denies injury or hitting her head yesterday  Denies loss of consciousness  Currently patient is complaining of having left breast pain, complaining of having intermittent episodes of slurred speech, denies any other associate symptoms  Currently she denies fever, chills, chest pain, dyspnea, cough, nausea, vomiting, dizziness, tinnitus, dysuria, diarrhea, any other new complaints  No other events reported  Review of Systems:  Review of Systems   Constitutional: Negative for chills, diaphoresis, fatigue and fever  HENT: Negative for congestion  Eyes: Negative for visual disturbance  Respiratory: Negative for cough and shortness of breath  Cardiovascular: Negative for chest pain and palpitations  Gastrointestinal: Negative for abdominal pain, diarrhea, nausea and vomiting  Endocrine: Negative for polyuria  Genitourinary: Negative for dysuria     Musculoskeletal: Negative "for neck stiffness  Neurological: Negative for weakness  Psychiatric/Behavioral: Negative for agitation  Past Medical and Surgical History:   Past Medical History:   Diagnosis Date   • Diabetes ([facility] )    • Hypertension    • Sciatica    • Stage 4 chronic kidney disease ([facility] )        History reviewed  No pertinent surgical history  Meds/Allergies:  Prior to Admission medications    Not on File         Allergies: Allergies   Allergen Reactions   • Penicillin V Hives       Social History:  Marital Status:    Patient Pre-hospital Level of Mobility: walks with walker  Substance Use History:   Social History     Substance and Sexual Activity   Alcohol Use Not Currently     Social History     Tobacco Use   Smoking Status Never   Smokeless Tobacco Never     Social History     Substance and Sexual Activity   Drug Use Yes   • Types: Marijuana       Family History:  History reviewed  No pertinent family history  Physical Exam:     Vitals:   Blood Pressure: 124/85 (05/05/23 1536)  Pulse: 91 (05/05/23 1536)  Temperature: 98 1 °F (36 7 °C) (05/05/23 1536)  Temp Source: Oral (05/05/23 1222)  Respirations: 20 (05/05/23 1430)  Height: 5' 5\" (165 1 cm) (05/05/23 1222)  Weight - Scale: 117 kg (257 lb) (05/05/23 1222)  SpO2: 98 % (05/05/23 1536)    Physical Exam  Constitutional:       General: She is not in acute distress  Appearance: Normal appearance  She is obese  She is not ill-appearing  Comments: Morbidly obese female patient, acutely nontoxic-appearing  HENT:      Head: Normocephalic and atraumatic  Eyes:      Pupils: Pupils are equal, round, and reactive to light  Cardiovascular:      Rate and Rhythm: Normal rate  Pulses: Normal pulses  Pulmonary:      Effort: Pulmonary effort is normal  No respiratory distress  Breath sounds: Normal breath sounds  No wheezing  Abdominal:      General: Bowel sounds are normal  There is no distension  Palpations: Abdomen is soft        " Tenderness: There is no abdominal tenderness  Musculoskeletal:      Cervical back: No rigidity  Right lower leg: No edema  Left lower leg: No edema  Skin:     Findings: Rash (Chronic left breast wound noted ) present  Neurological:      Mental Status: She is alert and oriented to person, place, and time  Comments: Currently she is awake, alert, oriented x3  No slurred speech noted during the encounter  No focal motor deficit noted on exam    Psychiatric:         Mood and Affect: Mood normal          Additional Data:     Lab Results:  Results from last 7 days   Lab Units 05/05/23  1255   WBC Thousand/uL 12 35*   HEMOGLOBIN g/dL 10 8*   HEMATOCRIT % 35 1   PLATELETS Thousands/uL 328   NEUTROS PCT % 74   LYMPHS PCT % 11*   MONOS PCT % 7   EOS PCT % 8*     Results from last 7 days   Lab Units 05/05/23  1255   SODIUM mmol/L 135   POTASSIUM mmol/L 5 6*   CHLORIDE mmol/L 107   CO2 mmol/L 24   BUN mg/dL 36*   CREATININE mg/dL 2 65*   ANION GAP mmol/L 4   CALCIUM mg/dL 9 2   ALBUMIN g/dL 3 5   TOTAL BILIRUBIN mg/dL 0 28   ALK PHOS U/L 108*   ALT U/L 6*   AST U/L 8*   GLUCOSE RANDOM mg/dL 87         Results from last 7 days   Lab Units 05/05/23  1559   POC GLUCOSE mg/dl 78               Lines/Drains:  Invasive Devices     Peripheral Intravenous Line  Duration           Peripheral IV 05/05/23 Right Antecubital <1 day                    Imaging: Reviewed radiology reports from this admission including: CT head  XR chest 2 views   Final Result by Tommie Simmons MD (05/05 1451)      No acute cardiopulmonary disease  Workstation performed: [id number]         CT head without contrast   Final Result by Greggory Dance, MD (05/05 1342)      No acute intracranial process  No skull fracture  Chronic microangiopathy                                      Workstation performed: [id number]             EKG and Other Studies Reviewed on Admission:   · EKG: NSR     ** Please Note: This note has been constructed using a voice recognition system   **

## 2023-05-05 NOTE — ASSESSMENT & PLAN NOTE
Presented with hyperkalemia, acute kidney injury CKD  Suspected secondary to Bactrim  EKG noted with normal sinus rhythm without acute changes  Continue with IV fluids  Monitor BMP  Continue telemetry monitoring

## 2023-05-06 LAB
ANION GAP SERPL CALCULATED.3IONS-SCNC: 3 MMOL/L (ref 4–13)
ANION GAP SERPL CALCULATED.3IONS-SCNC: 8 MMOL/L (ref 4–13)
BASOPHILS # BLD AUTO: 0.01 THOUSANDS/ÂΜL (ref 0–0.1)
BASOPHILS NFR BLD AUTO: 0 % (ref 0–1)
BUN SERPL-MCNC: 28 MG/DL (ref 5–25)
BUN SERPL-MCNC: 30 MG/DL (ref 5–25)
CALCIUM SERPL-MCNC: 10.2 MG/DL (ref 8.4–10.2)
CALCIUM SERPL-MCNC: 9.7 MG/DL (ref 8.4–10.2)
CARDIAC TROPONIN I PNL SERPL HS: 3 NG/L (ref 8–18)
CHLORIDE SERPL-SCNC: 109 MMOL/L (ref 96–108)
CHLORIDE SERPL-SCNC: 110 MMOL/L (ref 96–108)
CO2 SERPL-SCNC: 21 MMOL/L (ref 21–32)
CO2 SERPL-SCNC: 26 MMOL/L (ref 21–32)
CREAT SERPL-MCNC: 1.87 MG/DL (ref 0.6–1.3)
CREAT SERPL-MCNC: 1.98 MG/DL (ref 0.6–1.3)
EOSINOPHIL # BLD AUTO: 0.68 THOUSAND/ÂΜL (ref 0–0.61)
EOSINOPHIL NFR BLD AUTO: 7 % (ref 0–6)
ERYTHROCYTE [DISTWIDTH] IN BLOOD BY AUTOMATED COUNT: 16.6 % (ref 11.6–15.1)
GFR SERPL CREATININE-BSD FRML MDRD: 25 ML/MIN/1.73SQ M
GFR SERPL CREATININE-BSD FRML MDRD: 26 ML/MIN/1.73SQ M
GLUCOSE SERPL-MCNC: 112 MG/DL (ref 65–140)
GLUCOSE SERPL-MCNC: 112 MG/DL (ref 65–140)
GLUCOSE SERPL-MCNC: 118 MG/DL (ref 65–140)
GLUCOSE SERPL-MCNC: 146 MG/DL (ref 65–140)
GLUCOSE SERPL-MCNC: 20 MG/DL (ref 65–140)
GLUCOSE SERPL-MCNC: 29 MG/DL (ref 65–140)
GLUCOSE SERPL-MCNC: 38 MG/DL (ref 65–140)
GLUCOSE SERPL-MCNC: 56 MG/DL (ref 65–140)
GLUCOSE SERPL-MCNC: 76 MG/DL (ref 65–140)
GLUCOSE SERPL-MCNC: 86 MG/DL (ref 65–140)
GLUCOSE SERPL-MCNC: 88 MG/DL (ref 65–140)
GLUCOSE SERPL-MCNC: 88 MG/DL (ref 65–140)
GLUCOSE SERPL-MCNC: 93 MG/DL (ref 65–140)
GLUCOSE SERPL-MCNC: 99 MG/DL (ref 65–140)
HCT VFR BLD AUTO: 34.8 % (ref 34.8–46.1)
HGB BLD-MCNC: 10.7 G/DL (ref 11.5–15.4)
IMM GRANULOCYTES # BLD AUTO: 0.03 THOUSAND/UL (ref 0–0.2)
IMM GRANULOCYTES NFR BLD AUTO: 0 % (ref 0–2)
LYMPHOCYTES # BLD AUTO: 0.64 THOUSANDS/ÂΜL (ref 0.6–4.47)
LYMPHOCYTES NFR BLD AUTO: 7 % (ref 14–44)
MCH RBC QN AUTO: 25.2 PG (ref 26.8–34.3)
MCHC RBC AUTO-ENTMCNC: 30.7 G/DL (ref 31.4–37.4)
MCV RBC AUTO: 82 FL (ref 82–98)
MONOCYTES # BLD AUTO: 0.52 THOUSAND/ÂΜL (ref 0.17–1.22)
MONOCYTES NFR BLD AUTO: 5 % (ref 4–12)
NEUTROPHILS # BLD AUTO: 8.01 THOUSANDS/ÂΜL (ref 1.85–7.62)
NEUTS SEG NFR BLD AUTO: 81 % (ref 43–75)
NRBC BLD AUTO-RTO: 0 /100 WBCS
PLATELET # BLD AUTO: 280 THOUSANDS/UL (ref 149–390)
PMV BLD AUTO: 10.6 FL (ref 8.9–12.7)
POTASSIUM SERPL-SCNC: 3.9 MMOL/L (ref 3.5–5.3)
POTASSIUM SERPL-SCNC: 5.7 MMOL/L (ref 3.5–5.3)
RBC # BLD AUTO: 4.25 MILLION/UL (ref 3.81–5.12)
SODIUM SERPL-SCNC: 138 MMOL/L (ref 135–147)
SODIUM SERPL-SCNC: 139 MMOL/L (ref 135–147)
TSH SERPL DL<=0.05 MIU/L-ACNC: 4.85 UIU/ML (ref 0.45–4.5)
WBC # BLD AUTO: 9.89 THOUSAND/UL (ref 4.31–10.16)

## 2023-05-06 PROCEDURE — 82607 VITAMIN B-12: CPT | Performed by: STUDENT IN AN ORGANIZED HEALTH CARE EDUCATION/TRAINING PROGRAM

## 2023-05-06 PROCEDURE — 94664 DEMO&/EVAL PT USE INHALER: CPT

## 2023-05-06 PROCEDURE — 94760 N-INVAS EAR/PLS OXIMETRY 1: CPT

## 2023-05-06 PROCEDURE — 82728 ASSAY OF FERRITIN: CPT | Performed by: STUDENT IN AN ORGANIZED HEALTH CARE EDUCATION/TRAINING PROGRAM

## 2023-05-06 PROCEDURE — 83550 IRON BINDING TEST: CPT | Performed by: STUDENT IN AN ORGANIZED HEALTH CARE EDUCATION/TRAINING PROGRAM

## 2023-05-06 PROCEDURE — 80048 BASIC METABOLIC PNL TOTAL CA: CPT | Performed by: PHYSICIAN ASSISTANT

## 2023-05-06 PROCEDURE — 99232 SBSQ HOSP IP/OBS MODERATE 35: CPT | Performed by: PHYSICIAN ASSISTANT

## 2023-05-06 PROCEDURE — 84443 ASSAY THYROID STIM HORMONE: CPT | Performed by: PHYSICIAN ASSISTANT

## 2023-05-06 PROCEDURE — 83540 ASSAY OF IRON: CPT | Performed by: STUDENT IN AN ORGANIZED HEALTH CARE EDUCATION/TRAINING PROGRAM

## 2023-05-06 PROCEDURE — 82948 REAGENT STRIP/BLOOD GLUCOSE: CPT

## 2023-05-06 PROCEDURE — 84484 ASSAY OF TROPONIN QUANT: CPT | Performed by: PHYSICIAN ASSISTANT

## 2023-05-06 PROCEDURE — 82306 VITAMIN D 25 HYDROXY: CPT | Performed by: STUDENT IN AN ORGANIZED HEALTH CARE EDUCATION/TRAINING PROGRAM

## 2023-05-06 PROCEDURE — 82746 ASSAY OF FOLIC ACID SERUM: CPT | Performed by: STUDENT IN AN ORGANIZED HEALTH CARE EDUCATION/TRAINING PROGRAM

## 2023-05-06 PROCEDURE — 94660 CPAP INITIATION&MGMT: CPT

## 2023-05-06 PROCEDURE — 84439 ASSAY OF FREE THYROXINE: CPT | Performed by: PHYSICIAN ASSISTANT

## 2023-05-06 PROCEDURE — 93005 ELECTROCARDIOGRAM TRACING: CPT

## 2023-05-06 PROCEDURE — 97162 PT EVAL MOD COMPLEX 30 MIN: CPT

## 2023-05-06 PROCEDURE — 85025 COMPLETE CBC W/AUTO DIFF WBC: CPT | Performed by: PHYSICIAN ASSISTANT

## 2023-05-06 RX ORDER — DEXTROSE MONOHYDRATE 25 G/50ML
12.5 INJECTION, SOLUTION INTRAVENOUS ONCE
Status: COMPLETED | OUTPATIENT
Start: 2023-05-06 | End: 2023-05-06

## 2023-05-06 RX ORDER — DEXTROSE 25 % IN WATER 25 %
50 SYRINGE (ML) INTRAVENOUS ONCE
Status: CANCELLED | OUTPATIENT
Start: 2023-05-06 | End: 2023-05-06

## 2023-05-06 RX ORDER — TRAMADOL HYDROCHLORIDE 50 MG/1
75 TABLET ORAL EVERY 6 HOURS PRN
Status: DISCONTINUED | OUTPATIENT
Start: 2023-05-06 | End: 2023-05-08

## 2023-05-06 RX ORDER — DEXTROSE MONOHYDRATE 25 G/50ML
50 INJECTION, SOLUTION INTRAVENOUS ONCE
Status: COMPLETED | OUTPATIENT
Start: 2023-05-06 | End: 2023-05-06

## 2023-05-06 RX ORDER — DEXTROSE MONOHYDRATE 50 MG/ML
100 INJECTION, SOLUTION INTRAVENOUS CONTINUOUS
Status: DISPENSED | OUTPATIENT
Start: 2023-05-06 | End: 2023-05-08

## 2023-05-06 RX ORDER — SODIUM POLYSTYRENE SULFONATE 4.1 MEQ/G
15 POWDER, FOR SUSPENSION ORAL; RECTAL ONCE
Status: DISCONTINUED | OUTPATIENT
Start: 2023-05-06 | End: 2023-05-06

## 2023-05-06 RX ORDER — ONDANSETRON 2 MG/ML
4 INJECTION INTRAMUSCULAR; INTRAVENOUS EVERY 6 HOURS PRN
Status: DISCONTINUED | OUTPATIENT
Start: 2023-05-06 | End: 2023-05-10 | Stop reason: HOSPADM

## 2023-05-06 RX ORDER — DEXTROSE MONOHYDRATE 25 G/50ML
25 INJECTION, SOLUTION INTRAVENOUS ONCE
Status: COMPLETED | OUTPATIENT
Start: 2023-05-06 | End: 2023-05-06

## 2023-05-06 RX ADMIN — SODIUM CHLORIDE 100 ML/HR: 0.9 INJECTION, SOLUTION INTRAVENOUS at 03:19

## 2023-05-06 RX ADMIN — TIMOLOL MALEATE 1 DROP: 5 SOLUTION/ DROPS OPHTHALMIC at 09:04

## 2023-05-06 RX ADMIN — TRAMADOL HYDROCHLORIDE 75 MG: 50 TABLET, COATED ORAL at 23:16

## 2023-05-06 RX ADMIN — LATANOPROST 1 DROP: 50 SOLUTION OPHTHALMIC at 22:22

## 2023-05-06 RX ADMIN — ONDANSETRON 4 MG: 2 INJECTION INTRAMUSCULAR; INTRAVENOUS at 13:14

## 2023-05-06 RX ADMIN — DEXTROSE 75 ML/HR: 5 SOLUTION INTRAVENOUS at 15:37

## 2023-05-06 RX ADMIN — ACETAMINOPHEN 650 MG: 325 TABLET ORAL at 03:49

## 2023-05-06 RX ADMIN — ASPIRIN 81 MG: 81 TABLET, CHEWABLE ORAL at 09:03

## 2023-05-06 RX ADMIN — HEPARIN SODIUM 5000 UNITS: 5000 INJECTION INTRAVENOUS; SUBCUTANEOUS at 22:23

## 2023-05-06 RX ADMIN — INSULIN HUMAN 10 UNITS: 100 INJECTION, SOLUTION PARENTERAL at 12:12

## 2023-05-06 RX ADMIN — TRAMADOL HYDROCHLORIDE 75 MG: 50 TABLET, COATED ORAL at 12:12

## 2023-05-06 RX ADMIN — DEXTROSE MONOHYDRATE 25 G: 25 INJECTION, SOLUTION INTRAVENOUS at 12:12

## 2023-05-06 RX ADMIN — DEXTROSE MONOHYDRATE 50 ML: 25 INJECTION, SOLUTION INTRAVENOUS at 15:42

## 2023-05-06 RX ADMIN — DULOXETINE HYDROCHLORIDE 60 MG: 60 CAPSULE, DELAYED RELEASE ORAL at 09:03

## 2023-05-06 RX ADMIN — DEXTROSE MONOHYDRATE 12.5 G: 25 INJECTION, SOLUTION INTRAVENOUS at 14:58

## 2023-05-06 RX ADMIN — TIMOLOL MALEATE 1 DROP: 5 SOLUTION/ DROPS OPHTHALMIC at 17:06

## 2023-05-06 RX ADMIN — HEPARIN SODIUM 5000 UNITS: 5000 INJECTION INTRAVENOUS; SUBCUTANEOUS at 07:12

## 2023-05-06 RX ADMIN — HEPARIN SODIUM 5000 UNITS: 5000 INJECTION INTRAVENOUS; SUBCUTANEOUS at 13:23

## 2023-05-06 RX ADMIN — LATANOPROST 1 DROP: 50 SOLUTION OPHTHALMIC at 09:04

## 2023-05-06 NOTE — ASSESSMENT & PLAN NOTE
Lab Results   Component Value Date    HGBA1C 6 1 (H) 03/23/2023       Recent Labs     05/05/23  1559 05/06/23  0723   POCGLU 78 88       Blood Sugar Average: Last 72 hrs:  (P) 83   · Controlled a/e/b A1c  · Patient is on once weekly Ozempic - hold inpatient  · Patient is requesting for regular diet and not diabetic diet since her A1c is controlled which is not unreasonable  · Continue with Accu-Cheks monitoring and signs of coverage

## 2023-05-06 NOTE — NUTRITION
"   05/06/23 1354   Biochemical Data,Medical Tests, and Procedures   Biochemical Data/Medical Tests/Procedures Meds reviewed;Lab values reviewed   Labs (Comment) 5/6/23 K 5 7, Cl 109, BUN 30, creat 1 98   Meds (Comment) heparin, LR infusion, NaCl infusion   Nutrition-Focused Physical Exam   Nutrition-Focused Physical Exam Findings RN skin assessment reviewed; No edema documented; No skin issues documented   Medical-Related Concerns diabetes, anemia   Current PO Intake   Current Diet Order Regular diet, thin liquids   Current Meal Intake Suboptimal, but improving   Estimated calorie intake compared to estimated need Nutrient needs not met acutely, pt with nausea  PES Statement   Energy Balance (1) Predicted suboptimal energy intake NI-1 4   Related to Nausea   As evidenced by: Per patient/family interview; Intake < estimated needs   Recommendations/Interventions   Malnutrition/BMI Present Yes   Adult BMI Classifications Morbid Obesity 40-44 9   Summary Wound Care RN consulted; High BMI 42 77  Presents s/p fall at home  Found to have LEOLA on CKD  Past medical history significant for diabetes, anemia  Weight history reviewed  No significant changes to note  Skin integrity intact  No pressure areas  Ordered for Regular diet with thin liquids  Pt reports her appetite is \"not bad\" - feeling nausea today  She canceled both breakfast and lunch today for nausea  Pt reports sipping on and tolerating tea  She usually has 2 meals daily  Diet consists of salads, ground meats, and brown rice  Usually does not eat breakfast  Her daughter cooks and grocery shops  Pt states her diabetes is very controlled - does not check blood sugar regularly, not on medication, and does not take insulin  Pt has no nutrition questions at this time  RD to monitor PO intakes at follow up assessment     Interventions/Recommendations Continue current diet order;Monitor I & O's   Education Assessment   Education Education not indicated at this time " Patient Nutrition Goals   Goal Adequate hydration; Adequate intake;Meet PO needs

## 2023-05-06 NOTE — PHYSICAL THERAPY NOTE
"PHYSICAL THERAPY EVALUATION  NAME:  Radu Perdomo  DATE: 05/06/23    AGE:   81 y o  Mrn:   [id number]  ADMIT DX:  Hyperkalemia [E87 5]  Head injury [S09 90XA]  Fall in home, initial encounter [W19  XXXA, G99 145]  Acute kidney injury superimposed on chronic kidney disease ([facility] ) [N17 9, N18 9]  Problem List:   Patient Active Problem List   Diagnosis    Acute kidney injury superimposed on chronic kidney disease ([facility] [address] )    Hyperkalemia    Fall    Hidradenitis    DM (diabetes mellitus) ([facility] )    Anemia       Past Medical History  Past Medical History:   Diagnosis Date    Diabetes ([facility] )     Hypertension     Sciatica     Stage 4 chronic kidney disease ([facility] )        Past Surgical History  History reviewed  No pertinent surgical history  Length Of Stay: 1  Performed at least 2 patient identifiers during session: Name, Milbert Foots, and ID bracelet       05/06/23 1122   PT Last Visit   PT Visit Date 05/06/23   Note Type   Note type Evaluation   Pain Assessment   Pain Assessment Tool 0-10   Pain Score No Pain   Restrictions/Precautions   Weight Bearing Precautions Per Order No   Other Precautions Fall Risk;Multiple lines; Bed Alarm; Chair Alarm   Home Living   Type of [address] [place] [address] Two level;Bed/bath upstairs  (0 JOSHUA  FOS to second floor with HR)   Bathroom Shower/Tub Tub/shower unit  (walk in shower available)   [address] [place] [address] [address]   [facility]   (rollator (2))   Additional Comments Pt reports use of 4ww for ambulation   Prior Function   Level of [place] Independent with ADLs; Independent with functional mobility; Needs assistance with IADLS   Lives With Daughter;Family  (2 grandsons)   Receives Help From [facility] in the last 6 months 1 to 4  (2 per pt    \"I kept going to the left\")   Vocational Retired   General   Family/Caregiver Present No   Cognition   Overall Cognitive Status [facility]   Arousal/Participation Alert   Orientation " Level Oriented X4   Memory Within functional limits   Following Commands Follows all commands and directions without difficulty   Comments Pt agreeable to PT evaluation   RLE Assessment   RLE Assessment   (grossly assessed during functional mobility 4-/5)   LLE Assessment   LLE Assessment   (grossly assessed during functional mobility 4-/5)   Bed Mobility   Additional Comments bed mobility not tested as pt seated EOB at start of session and seated in bedside chair at end of session   Transfers   Sit to Stand 5  Supervision   Additional items Increased time required;Verbal cues   Stand to Sit 5  Supervision   Additional items Increased time required;Verbal cues   Stand pivot 5  Supervision   Additional items Increased time required;Verbal cues   Toilet transfer 5  Supervision   Additional items Increased time required;Standard toilet  (grab bar)   Additional Comments Rollator used during transfers   Ambulation/Elevation   Gait pattern Improper Weight shift;Decreased foot clearance; Foward flexed; Short stride; Step to;Excessively slow;Decreased heel strike   Gait Assistance 5  Supervision   Additional items Assist x 1;Verbal cues   Assistive Device 4-wheeled walker   Distance 12', 10'   Stair Management Assistance Not tested   Ambulation/Elevation Additional Comments Vitals at end of session BP: 120/82, HR: 101bpm, SPo2: 97% on RA   Balance   Static Sitting Good   Dynamic Sitting Fair +   Static Standing Fair +   Dynamic Standing Fair +   Ambulatory Fair +   Activity Tolerance   Activity Tolerance Patient limited by fatigue   Nurse Made Aware RN Tyra   Assessment   Prognosis Good   Problem List Decreased endurance; Impaired balance;Decreased mobility   Assessment Pt is 81 y o  female seen for moderate-complexity PT evaluation on 5/6/2023 s/p admit to [address] on 5/5/2023 w/ Acute kidney injury superimposed on chronic kidney disease ([facility] [address] )   PT was consulted to assess pt's functional mobility and d/c needs  Order placed for PT eval and tx, w/ up and OOB as tolerated order  PTA, pt was living with her daughter and 2 grandsons in a two story home with 0 JOSHUA and flight of stairs to second floor  Pt reports independence at baseline with ADLs and functional mobility with use of rollator and pt requires assist for IADLs  At time of eval, patient greeted seated EOB and requesting to use restroom  Pt ambulated 12',10' with rollator walker supervision without LOB in any direction and completed STS and toilet transfer supervision  Upon evaluation, pt presenting with impaired functional mobility d/t decreased endurance, impaired balance and activity intolerance  Pertinent PMHx and current co-morbidities affecting pt's physical performance at time of assessment include: fall, acute kidney injury superimposed on chronic kidney disease, DM  Personal factors affecting pt at time of eval include: lives in 2 story house, ambulating w/ assistive device and positive fall history  The following objective measures performed on IE also reveal limitations: AM-PAC 6-Clicks: 43/81  Pt's clinical presentation is currently evolving seen in pt's presentation of advanced age, tolerance to only 12 feet of ambulation and ongoing medical assessment  Overall, pt's rehab potential and prognosis to return to PLOF is good as impacted by objective findings, warranting pt to receive further skilled PT interventions to address identified impairments, activity limitation(s), and participation restriction(s)  Goal for patient is to return home  Pt to benefit from continued PT tx to address deficits as defined above and maximize level of functional independent mobility and consistency in order for pt to increase safety and decrease fall risk with functional mobility  From PT/mobility standpoint, recommendation at time of d/c would be home with home health rehabilitation pending progress in order to facilitate return to PLOF     Goals   Patient Goals to go home and feel better   STG Expiration Date 05/16/23   Short Term Goal #1 In 10 days: Perform all bed mobility tasks modified independent to decrease caregiver burden, Perform all transfers modified independent to improve independence, Ambulate > 80 ft  with least restrictive assistive device modified independent w/o LOB and w/ normalized gait pattern 100% of the time, Navigate 12 stairs with distant S with unilateral handrail to facilitate return to previous living environment, Increase all balance 1/2 grade to decrease risk for falls and PT provider will perform functional balance assessment to determine fall risk   PT Treatment Day 0   Plan   Treatment/Interventions Functional transfer training;Elevations; Therapeutic exercise; Endurance training;Patient/family training;Bed mobility;Gait training;Spoke to nursing   PT Frequency 3-5x/wk   Recommendation   PT Discharge Recommendation Home with home health rehabilitation   AM-PAC Basic Mobility Inpatient   Turning in Flat Bed Without Bedrails 4   Lying on Back to Sitting on Edge of Flat Bed Without Bedrails 4   Moving Bed to Chair 3   Standing Up From Chair Using Arms 3   Walk in Room 3   Climb 3-5 Stairs With Railing 3   Basic Mobility Inpatient Raw Score 20   Basic Mobility Standardized Score 43 99   Highest Level Of Mobility   JH-HLM Goal 6: Walk 10 steps or more   JH-HLM Achieved 7: Walk 25 feet or more   End of Consult   Patient Position at End of Consult All needs within reach;Bed/Chair alarm activated; Bedside chair       Time In: 1120  Time Out: 1144  Total Evaluation Minutes: 24    Osvaldo Weeks, PT

## 2023-05-06 NOTE — PLAN OF CARE
Problem: PHYSICAL THERAPY ADULT  Goal: Performs mobility at highest level of function for planned discharge setting  See evaluation for individualized goals  Description: Treatment/Interventions: Functional transfer training, Elevations, Therapeutic exercise, Endurance training, Patient/family training, Bed mobility, Gait training, Spoke to nursing          See flowsheet documentation for full assessment, interventions and recommendations  Note: Prognosis: Good  Problem List: Decreased endurance, Impaired balance, Decreased mobility  Assessment: Pt is 81 y o  female seen for moderate-complexity PT evaluation on 5/6/2023 s/p admit to [id number] [facility] on 5/5/2023 w/ Acute kidney injury superimposed on chronic kidney disease ([facility] )  PT was consulted to assess pt's functional mobility and d/c needs  Order placed for PT eval and tx, w/ up and OOB as tolerated order  PTA, pt was living with her daughter and 2 grandsons in a two story home with 0 JOSHUA and flight of stairs to second floor  Pt reports independence at baseline with ADLs and functional mobility with use of rollator and pt requires assist for IADLs  At time of eval, patient greeted seated EOB and requesting to use restroom  Pt ambulated 12',10' with rollator walker supervision without LOB in any direction and completed STS and toilet transfer supervision  Upon evaluation, pt presenting with impaired functional mobility d/t decreased endurance, impaired balance and activity intolerance  Pertinent PMHx and current co-morbidities affecting pt's physical performance at time of assessment include: fall, acute kidney injury superimposed on chronic kidney disease, DM  Personal factors affecting pt at time of eval include: lives in 2 story house, ambulating w/ assistive device and positive fall history  The following objective measures performed on IE also reveal limitations: AM-PAC 6-Clicks: 80/75   Pt's clinical presentation is currently evolving seen in pt's presentation of advanced age, tolerance to only 12 feet of ambulation and ongoing medical assessment  Overall, pt's rehab potential and prognosis to return to PLOF is good as impacted by objective findings, warranting pt to receive further skilled PT interventions to address identified impairments, activity limitation(s), and participation restriction(s)  Goal for patient is to return home  Pt to benefit from continued PT tx to address deficits as defined above and maximize level of functional independent mobility and consistency in order for pt to increase safety and decrease fall risk with functional mobility  From PT/mobility standpoint, recommendation at time of d/c would be home with home health rehabilitation pending progress in order to facilitate return to PLOF  PT Discharge Recommendation: Home with home health rehabilitation    See flowsheet documentation for full assessment     Liliam Carbajal; PT, DPT

## 2023-05-06 NOTE — ASSESSMENT & PLAN NOTE
· Presented with hyperkalemia, acute kidney injury  · EKG noted with normal sinus rhythm without acute changes  · Continue with IV fluids  · Continue telemetry monitoring  · Slight improvement on a m  labs at 5 4    We will give an additional dose of Kayexalate  · Repeat BMP this afternoon

## 2023-05-06 NOTE — ASSESSMENT & PLAN NOTE
Presents status post 2 falls, losing her balance  Patient reports head strike without loss of consciousness    Question of slurred speech at times along with report of difficulty with memory lately    · CT head - negative  · Fall precautions, PT and OT consulted  · Labs do reveal elevated TSH, mild leukocytosis, LEOLA, hyperkalemia  · See individual treatment plans below

## 2023-05-06 NOTE — RESPIRATORY THERAPY NOTE
05/06/23 0334   Respiratory Assessment   Resp Comments placed pt  on cpap for HS use   O2 Device v30 Snuffy   Non-Invasive Information   O2 Interface Device Face mask  (size medium)   Non-Invasive Ventilation Mode CPAP   $ Intermittent NIV Yes   SpO2 97 %   $ Pulse Oximetry Spot Check Charge Completed   Non-Invasive Settings   FiO2 (%) 21   PEEP/CPAP (cm H2O)   (auto 7-18 cmH2o)   Rise Time 2   Humidification   (n/a, dry circuit)   Non-Invasive Readings   Skin Intervention Skin intact   Total Rate 24   Spontaneous Vt (mL) 229   Spontaneous MV (mL) 6   Auto PEEP Observed (cm H2O) 7   I/E Ratio (Obs) 1:4 9   Heater Temperature (Obs)   (n/a)   Leak (lpm) 13   Non-Invasive Alarms   Low Insp Pressure Time (sec) 60 sec   MV Low (L/min) 2   High Resp Rate (BPM) 40 BPM   Apnea Interval (sec) 30   pt does not know home unit cpap setting nor could be found in e chart, denies need for supplemental o2, cpap set for auto 7-18 cmH2o

## 2023-05-06 NOTE — ASSESSMENT & PLAN NOTE
Baseline CKD stage III; cr around 1 6-1 7 range  · Currently on presentation 2 65   · Hyperkalemia 5 6  · Elevated TSH  · Suspected secondary to recently started Bactrim on 05/01/2023 for mastitis  · Follow-up with wound care consult  Stop Bactrim for now  · Continue IV fluids  · Monitor BMP  · F/u with repeat TSH in Am  · Avoid nephrotoxic agent    · Monitor I/Os  · Urinary retention protocol  · Consider nephrology consultation  ·

## 2023-05-06 NOTE — ASSESSMENT & PLAN NOTE
· Noted with anemia w/o signs of active overt bleeding repeat   · EGD in 01/2023 noted with hiatal hernia, nonobstructive Schatzki's ring, erosive gastritis  · Colonoscopy 01/2023 report noted with diverticulosis, small 2 to 5 mm polyp was removed at the time  · Current hemoglobin stable 10 8    · Follow-up with iron panel, suspect secondary to CKD

## 2023-05-06 NOTE — ASSESSMENT & PLAN NOTE
· Patient has history of left breast hidradenitis  · Patient was started on Bactrim by PCP on 05/01/2023  · Presented with acute kidney injury, hyperkalemia likely due to Bactrim  · Noted no signs of infection on admission exam  · Stop Bactrim  · Follow-up with wound care consult

## 2023-05-06 NOTE — NURSING NOTE
On call 1324 Benjamin Hadley aware pt on telemetry, observed with episodes of bradycardia  Pt with no complaints or symptoms  Requesting a sleeping pill and her pain medication, tramadol  As per this provider to hold sleeping pill at this time due to bradycardia  Will continue to monitor patient

## 2023-05-06 NOTE — PLAN OF CARE
Problem: Potential for Falls  Goal: Patient will remain free of falls  Description: INTERVENTIONS:  - Educate patient/family on patient safety including physical limitations  - Instruct patient to call for assistance with activity   - Consult OT/PT to assist with strengthening/mobility   - Keep Call bell within reach  - Keep bed low and locked with side rails adjusted as appropriate  - Keep care items and personal belongings within reach  - Initiate and maintain comfort rounds  - Make Fall Risk Sign visible to staff  - Offer Toileting every 3 Hours, in advance of need  - Initiate/Maintain bed alarm  - Obtain necessary fall risk management equipment  - Apply yellow socks and bracelet for high fall risk patients  - Consider moving patient to room near nurses station  Outcome: Progressing     Problem: MOBILITY - ADULT  Goal: Maintain or return to baseline ADL function  Description: INTERVENTIONS:  -  Assess patient's ability to carry out ADLs; assess patient's baseline for ADL function and identify physical deficits which impact ability to perform ADLs (bathing, care of mouth/teeth, toileting, grooming, dressing, etc )  - Assess/evaluate cause of self-care deficits   - Assess range of motion  - Assess patient's mobility; develop plan if impaired  - Assess patient's need for assistive devices and provide as appropriate  - Encourage maximum independence but intervene and supervise when necessary  - Involve family in performance of ADLs  - Assess for home care needs following discharge   - Consider OT consult to assist with ADL evaluation and planning for discharge  - Provide patient education as appropriate  Outcome: Progressing  Goal: Maintains/Returns to pre admission functional level  Description: INTERVENTIONS:  - Perform BMAT or MOVE assessment daily    - Set and communicate daily mobility goal to care team and patient/family/caregiver     - Collaborate with rehabilitation services on mobility goals if consulted  - Perform Range of Motion 3 times a day  - Reposition patient every 3 hours    - Dangle patient 3 times a day  - Stand patient 3 times a day  - Ambulate patient 3 times a day  - Out of bed to chair 3 times a day   - Out of bed for meals 3 times a day  - Out of bed for toileting  - Record patient progress and toleration of activity level   Outcome: Progressing     Problem: PAIN - ADULT  Goal: Verbalizes/displays adequate comfort level or baseline comfort level  Description: Interventions:  - Encourage patient to monitor pain and request assistance  - Assess pain using appropriate pain scale  - Administer analgesics based on type and severity of pain and evaluate response  - Implement non-pharmacological measures as appropriate and evaluate response  - Consider cultural and social influences on pain and pain management  - Notify physician/advanced practitioner if interventions unsuccessful or patient reports new pain  Outcome: Progressing     Problem: INFECTION - ADULT  Goal: Absence or prevention of progression during hospitalization  Description: INTERVENTIONS:  - Assess and monitor for signs and symptoms of infection  - Monitor lab/diagnostic results  - Monitor all insertion sites, i e  indwelling lines, tubes, and drains  - Monitor endotracheal if appropriate and nasal secretions for changes in amount and color  - [place] appropriate cooling/warming therapies per order  - Administer medications as ordered  - Instruct and encourage patient and family to use good hand hygiene technique  - Identify and instruct in appropriate isolation precautions for identified infection/condition  Outcome: Progressing  Goal: Absence of fever/infection during neutropenic period  Description: INTERVENTIONS:  - Monitor WBC    Outcome: Progressing     Problem: SAFETY ADULT  Goal: Patient will remain free of falls  Description: INTERVENTIONS:  - Educate patient/family on patient safety including physical limitations  - Instruct patient to call for assistance with activity   - Consult OT/PT to assist with strengthening/mobility   - Keep Call bell within reach  - Keep bed low and locked with side rails adjusted as appropriate  - Keep care items and personal belongings within reach  - Initiate and maintain comfort rounds  - Make Fall Risk Sign visible to staff  - Offer Toileting every 3 Hours, in advance of need  - Initiate/Maintain bed alarm  - Obtain necessary fall risk management equipment  - Apply yellow socks and bracelet for high fall risk patients  - Consider moving patient to room near nurses station  Outcome: Progressing  Goal: Maintain or return to baseline ADL function  Description: INTERVENTIONS:  -  Assess patient's ability to carry out ADLs; assess patient's baseline for ADL function and identify physical deficits which impact ability to perform ADLs (bathing, care of mouth/teeth, toileting, grooming, dressing, etc )  - Assess/evaluate cause of self-care deficits   - Assess range of motion  - Assess patient's mobility; develop plan if impaired  - Assess patient's need for assistive devices and provide as appropriate  - Encourage maximum independence but intervene and supervise when necessary  - Involve family in performance of ADLs  - Assess for home care needs following discharge   - Consider OT consult to assist with ADL evaluation and planning for discharge  - Provide patient education as appropriate  Outcome: Progressing  Goal: Maintains/Returns to pre admission functional level  Description: INTERVENTIONS:  - Perform BMAT or MOVE assessment daily    - Set and communicate daily mobility goal to care team and patient/family/caregiver  - Collaborate with rehabilitation services on mobility goals if consulted  - Perform Range of Motion 3 times a day  - Reposition patient every 3 hours    - Dangle patient 3 times a day  - Stand patient 3 times a day  - Ambulate patient 3 times a day  - Out of bed to chair 3 times a day   - Out of bed for meals 3 times a day  - Out of bed for toileting  - Record patient progress and toleration of activity level   Outcome: Progressing     Problem: DISCHARGE PLANNING  Goal: Discharge to home or other facility with appropriate resources  Description: INTERVENTIONS:  - Identify barriers to discharge w/patient and caregiver  - Arrange for needed discharge resources and transportation as appropriate  - Identify discharge learning needs (meds, wound care, etc )  - Arrange for interpretive services to assist at discharge as needed  - Refer to Case Management Department for coordinating discharge planning if the patient needs post-hospital services based on physician/advanced practitioner order or complex needs related to functional status, cognitive ability, or social support system  Outcome: Progressing     Problem: Knowledge Deficit  Goal: Patient/family/caregiver demonstrates understanding of disease process, treatment plan, medications, and discharge instructions  Description: Complete learning assessment and assess knowledge base    Interventions:  - Provide teaching at level of understanding  - Provide teaching via preferred learning methods  Outcome: Progressing

## 2023-05-06 NOTE — UTILIZATION REVIEW
"Initial Clinical Review    Admission: Date/Time/Statement:   Admission Orders (From admission, onward)     Ordered        05/05/23 1445  [address] [place]  Once                      Orders Placed This Encounter   Procedures   • INPATIENT ADMISSION     Standing Status:   Standing     Number of Occurrences:   1     Order Specific Question:   Level of Care     Answer:   Med Surg [16]     Order Specific Question:   Estimated length of stay     Answer:   More than 2 Midnights     Order Specific Question:   Certification     Answer:   I certify that inpatient services are medically necessary for this patient for a duration of greater than two midnights  See H&P and MD Progress Notes for additional information about the patient's course of treatment  ED Arrival Information     Expected   -    Arrival   5/5/2023 12:16    Acuity   Emergent            Means of arrival   Walk-In    Escorted by   Family Member    Service   Hospitalist    Admission type   Emergency            Arrival complaint   AMS + LOSS OF BALANCE           Chief Complaint   Patient presents with   • Fall     Per pts daughter pt fell yesterday and hit her head on a door knob  Pt again fell out of the bed but denies injury or hitting her head  (+) Asprin  Pts daughter also states her mother has had slurred speech and has been having trouble finding her words and has been off balance  Daughter states she believes this started after pt began taking \"gabapentin\"  Initial Presentation: 81 y o  female presents to ED from home  After 2 falls at home, the day prior to admission and 2  Days  Before that  Complains of hitting head, no LOC  Complains of left breast pain, intermittent episodes of slurred speech  PMH  Is  DM,  CKD  Stage III, spinal stenosis, anemia, depression, memory issues and recently started on  Bactrim for  l breast mastitis  Labs  Reveal elevated creatinine and  Potassium  No EKG  Changes     Admit  Ip with  LEOLA/CKD, S/P fall,   " Hyperkalemia and plan is   Monitor labs,  Fall  Precautions, PT/OT, wound care consult, IVF, d/c  Bactrim, tele  And hold home meds  Date:   5 6      Day 2:   Continue  PT/OT  Need fall precautions  Suspect  LEOLA  D/T   Recently started bactrim  Continue  IVF  Slight improvement in  K  Had multiple episodes of loose stool  Overnight, mild nausea  Urine output adequate  C ontinue current meds/treatment plan      ED Triage Vitals   Temperature Pulse Respirations Blood Pressure SpO2   05/05/23 1222 05/05/23 1222 05/05/23 1222 05/05/23 1222 05/05/23 1222   98 8 °F (37 1 °C) 94 19 144/81 97 %      Temp Source Heart Rate Source Patient Position - Orthostatic VS BP Location FiO2 (%)   05/05/23 1222 05/05/23 1222 05/05/23 1222 05/05/23 1222 --   Oral Monitor Sitting Left arm       Pain Score       05/05/23 1553       10 - Worst Possible Pain          Wt Readings from Last 1 Encounters:   05/05/23 117 kg (257 lb)     Additional Vital Signs:   97 6 °F (36 4 °C) 117 Abnormal  -- 160/99 119 98 % -- -- --    05/06/23 13:20:01 97 6 °F (36 4 °C) 121 Abnormal  20 -- -- 99 % -- -- --   05/06/23 11:39:35 -- 101 -- 120/82 95 97 % -- -- --   05/06/23 0800 -- 86 -- -- -- 92 % -- -- --   05/06/23 07:26:32 98 1 °F (36 7 °C) 86 17 100/62 75 97 % -- -- --   05/06/23 0700 -- 64 -- -- -- 97 % -- -- --   05/06/23 0349 98 2 °F (36 8 °C) 82 18 120/90 88 98 % -- -- --   05/06/23 0334 -- -- -- -- -- 97 % -- Face mask  --   O2 Interface Device: size medium at 05/06/23 0334 05/05/23 23:42:14 98 1 °F (36 7 °C) 81 18 93/54 67 95 % None (Room air) -- --   05/05/23 2300 98 8 °F (37 1 °C) 78 18 110/60 88 98 % -- -- --   05/05/23 19:32:53 98 2 °F (36 8 °C) 84 16 108/70 83 94 % -- -- --   05/05/23 15:36:55 98 1 °F (36 7 °C) 91 -- 124/85 98 98 % -- -- --   05/05/23 1430 -- 82 20 124/55 79 97 % None (Room air) -- --   05/05/23 1400 -- 76 22 141/70 99 98 % None (Room air) -- --   05/05/23 1330 -- 78 18 148/80 107 96 % None (Room air) -- --   05/05/23 1300 -- 79 18 111/57 81 98 % None (Room air) -- --   05/05/23 1230 -- 86 19 128/61 88 95 % None (Room air) -- --   05/05/23 1222 98 8 °F (37 1 °C) 94 19 144/81 -- 97 % None (Room air) -- Sitting       Pertinent Labs/Diagnostic Test Results:   EKG  NSR  XR chest 2 views   Final Result by Anju Arellano MD (05/05 1451)      No acute cardiopulmonary disease  Workstation performed: [id number]         CT head without contrast   Final Result by Kenisha Estrada MD (05/05 1342)      No acute intracranial process  No skull fracture  Chronic microangiopathy                                      Workstation performed: [id number]               Results from last 7 days   Lab Units 05/06/23  1047 05/05/23  1255   WBC Thousand/uL 9 89 12 35*   HEMOGLOBIN g/dL 10 7* 10 8*   HEMATOCRIT % 34 8 35 1   PLATELETS Thousands/uL 280 328   NEUTROS ABS Thousands/µL 8 01* 9 06*         Results from last 7 days   Lab Units 05/06/23  1417 05/06/23  1047 05/05/23  2031 05/05/23  1255   SODIUM mmol/L 139 138 136 135   POTASSIUM mmol/L 3 9 5 7* 5 4* 5 6*   CHLORIDE mmol/L 110* 109* 109* 107   CO2 mmol/L 21 26 23 24   ANION GAP mmol/L 8 3* 4 4   BUN mg/dL 28* 30* 34* 36*   CREATININE mg/dL 1 87* 1 98* 2 28* 2 65*   EGFR ml/min/1 73sq m 26 25 21 17   CALCIUM mg/dL 10 2 9 7 9 2 9 2   MAGNESIUM mg/dL  --   --   --  2 3     Results from last 7 days   Lab Units 05/05/23  1255   AST U/L 8*   ALT U/L 6*   ALK PHOS U/L 108*   TOTAL PROTEIN g/dL 8 1   ALBUMIN g/dL 3 5   TOTAL BILIRUBIN mg/dL 0 28   BILIRUBIN DIRECT mg/dL 0 00     Results from last 7 days   Lab Units 05/06/23  1607 05/06/23  1540 05/06/23  1512 05/06/23  1459 05/06/23  1317 05/06/23  1216 05/06/23  1109 05/06/23  0723 05/05/23  1559   POC GLUCOSE mg/dl 146* 38* 56* 20* 93 76 86 88 78     Results from last 7 days   Lab Units 05/06/23  1417 05/06/23  1047 05/05/23  2031 05/05/23  1255   GLUCOSE RANDOM mg/dL 29* 88 89 87 Results from last 7 days   Lab Units 05/05/23  1258   HS TNI 0HR ng/L <2             Results from last 7 days   Lab Units 05/06/23  1047 05/05/23  1255   TSH 3RD GENERATON uIU/mL 4 848* 5 873*             ED Treatment:   Medication Administration from 05/05/2023 1216 to 05/05/2023 1511       Date/Time Order Dose Route Action Comments     05/05/2023 1411 EDT lactated ringers bolus 1,000 mL 1,000 mL Intravenous New Bag --        Present on Admission:  • Acute kidney injury superimposed on chronic kidney disease ([facility] [address] )  • Hyperkalemia  • Fall  • Hidradenitis  • [facility]  • Anemia      Admitting Diagnosis: Hyperkalemia [E87 5]  Head injury [S09 90XA]  Fall in home, initial encounter [W19  XXXA, Y92 009]  Acute kidney injury superimposed on chronic kidney disease ([facility] [N17 9, N18 9]  Age/Sex: 81 y o  female  Admission Orders:  Scheduled Medications:  aspirin, 81 mg, Oral, Daily  DULoxetine, 60 mg, Oral, Daily  heparin (porcine), 5,000 Units, Subcutaneous, Q8H [facility]  insulin lispro, 1-5 Units, Subcutaneous, TID AC  latanoprost, 1 drop, Both Eyes, BID  pantoprazole, 40 mg, Oral, Early Morning  timolol, 1 drop, Both Eyes, BID      Continuous IV Infusions:  dextrose, 100 mL/hr, Intravenous, Continuous      PRN Meds:  acetaminophen, 650 mg, Oral, Q6H PRN  albuterol, 2 puff, Inhalation, Q6H PRN  amitriptyline, 25 mg, Oral, HS PRN  ondansetron, 4 mg, Intravenous, Q6H PRN  traMADol, 75 mg, Oral, Q6H PRN  trimethobenzamide, 200 mg, Intramuscular, Q6H PRN      Network Utilization Review Department  ATTENTION: Please call with any questions or concerns to [phone] and carefully listen to the prompts so that you are directed to the right person  All voicemails are confidential   N[place] T[place] all requests for admission clinical reviews, approved or denied determinations and any other requests to dedicated fax number below belonging to the campus where the patient is receiving treatment   List of dedicated fax numbers for the Facilities:  FACILITY NAME UR FAX NUMBER   ADMISSION DENIALS (Administrative/Medical Necessity) [phone]   [address]  (Maternity/NICU/Pediatrics) [phone]   [address] [phone]   [address] [place] [address] [facility] [phone] 1550 [facility] 815 [facility] [phone]

## 2023-05-06 NOTE — PROGRESS NOTES
[address] [facility]  Progress Note  Name: Jenna Epperson  MRN: [id number]  Unit/Bed#: -77 I Date of Admission: 5/5/2023   Date of Service: 5/6/2023  Hospital Day: 1    Assessment/Plan   Fall  Assessment & Plan  Presents status post 2 falls, losing her balance  Patient reports head strike without loss of consciousness  Question of slurred speech at times along with report of difficulty with memory lately    · CT head - negative  · Fall precautions, PT and OT consulted  · Labs do reveal elevated TSH, mild leukocytosis, LEOLA, hyperkalemia  · See individual treatment plans below    * Acute kidney injury superimposed on chronic kidney disease ([facility] [address] )  Assessment & Plan  Baseline CKD stage III; cr around 1 6-1 7 range  · Currently on presentation 2 65   · Hyperkalemia 5 6  · Elevated TSH  · Suspected secondary to recently started Bactrim on 05/01/2023 for mastitis  · Follow-up with wound care consult  Stop Bactrim for now  · Continue IV fluids  · Monitor BMP  · F/u with repeat TSH in Am  · Avoid nephrotoxic agent  · Monitor I/Os  · Urinary retention protocol  · Consider nephrology consultation  ·     Hyperkalemia  Assessment & Plan  · Presented with hyperkalemia, acute kidney injury  · EKG noted with normal sinus rhythm without acute changes  · Continue with IV fluids  · Continue telemetry monitoring  · Slight improvement on a m  labs at 5 4  We will give an additional dose of Kayexalate  · Repeat BMP this afternoon    Anemia  Assessment & Plan  · Noted with anemia w/o signs of active overt bleeding repeat   · EGD in 01/2023 noted with hiatal hernia, nonobstructive Schatzki's ring, erosive gastritis  · Colonoscopy 01/2023 report noted with diverticulosis, small 2 to 5 mm polyp was removed at the time  · Current hemoglobin stable 10 8    · Follow-up with iron panel, suspect secondary to CKD    DM (diabetes mellitus) [facility])  Assessment & Plan  Lab Results   Component Value Date HGBA1C 6 1 (H) 03/23/2023       Recent Labs     05/05/23  1559 05/06/23  0723   POCGLU 78 88       Blood Sugar Average: Last 72 hrs:  (P) 83   · Controlled a/e/b A1c  · Patient is on once weekly Ozempic - hold inpatient  · Patient is requesting for regular diet and not diabetic diet since her A1c is controlled which is not unreasonable  · Continue with Accu-Cheks monitoring and signs of coverage  Hidradenitis  Assessment & Plan  · Patient has history of left breast hidradenitis  · Patient was started on Bactrim by PCP on 05/01/2023  · Presented with acute kidney injury, hyperkalemia likely due to Bactrim  · Noted no signs of infection on admission exam  · Stop Bactrim  · Follow-up with wound care consult  VTE Pharmacologic Prophylaxis: VTE Score: 4 Moderate Risk (Score 3-4) - Pharmacological DVT Prophylaxis Ordered: heparin  Patient Centered Rounds: I performed bedside rounds with nursing staff today  Discussions with Specialists or Other Care Team Provider:     Education and Discussions with Family / Patient: Patient declined call to   Total Time Spent on Date of Encounter in care of patient: 35 minutes This time was spent on one or more of the following: performing physical exam; counseling and coordination of care; obtaining or reviewing history; documenting in the medical record; reviewing/ordering tests, medications or procedures; communicating with other healthcare professionals and discussing with patient's family/caregivers  Current Length of Stay: 1 day(s)  Current Patient Status: Inpatient   Certification Statement: The patient will continue to require additional inpatient hospital stay due to Pending repeat labs to further evaluate for stability of LEOLA and resolution of hyperkalemia  Discharge Plan: Anticipate discharge tomorrow to home  Code Status: Level 1 - Full Code    Subjective:   Patient seen this morning sitting up in bed    Reports multiple episodes of loose stools overnight  Some mild nausea  No vomiting  No headache or fever/chills  Reports good urine output  Denies any chest pain or palpitations  Reviewed her labs and the need for repeat blood work this morning to further determine plan for treatment and discharge  Objective:     Vitals:   Temp (24hrs), Av 3 °F (36 8 °C), Min:98 1 °F (36 7 °C), Max:98 8 °F (37 1 °C)    Temp:  [98 1 °F (36 7 °C)-98 8 °F (37 1 °C)] 98 1 °F (36 7 °C)  HR:  [64-94] 86  Resp:  [16-22] 17  BP: ()/(54-90) 100/62  SpO2:  [92 %-98 %] 92 %  Body mass index is 42 77 kg/m²  Input and Output Summary (last 24 hours): Intake/Output Summary (Last 24 hours) at 2023 0908  Last data filed at 2023 0500  Gross per 24 hour   Intake 451 67 ml   Output 200 ml   Net 251 67 ml       Physical Exam:   Physical Exam  Vitals and nursing note reviewed  Constitutional:       General: She is not in acute distress  Appearance: Normal appearance  She is obese  She is not ill-appearing or toxic-appearing  Cardiovascular:      Rate and Rhythm: Normal rate and regular rhythm  Heart sounds: Normal heart sounds  Pulmonary:      Effort: Pulmonary effort is normal  No respiratory distress  Breath sounds: Normal breath sounds  No wheezing  Abdominal:      General: Bowel sounds are normal  There is no distension  Palpations: Abdomen is soft  Skin:     General: Skin is warm and dry  Neurological:      Mental Status: She is alert and oriented to person, place, and time     Psychiatric:         Mood and Affect: Mood normal          Behavior: Behavior normal            Additional Data:     Labs:  Results from last 7 days   Lab Units 23  1255   WBC Thousand/uL 12 35*   HEMOGLOBIN g/dL 10 8*   HEMATOCRIT % 35 1   PLATELETS Thousands/uL 328   NEUTROS PCT % 74   LYMPHS PCT % 11*   MONOS PCT % 7   EOS PCT % 8*     Results from last 7 days   Lab Units 23  1255   SODIUM mmol/L 136 135 POTASSIUM mmol/L 5 4* 5 6*   CHLORIDE mmol/L 109* 107   CO2 mmol/L 23 24   BUN mg/dL 34* 36*   CREATININE mg/dL 2 28* 2 65*   ANION GAP mmol/L 4 4   CALCIUM mg/dL 9 2 9 2   ALBUMIN g/dL  --  3 5   TOTAL BILIRUBIN mg/dL  --  0 28   ALK PHOS U/L  --  108*   ALT U/L  --  6*   AST U/L  --  8*   GLUCOSE RANDOM mg/dL 89 87         Results from last 7 days   Lab Units 05/06/23  0723 05/05/23  1559   POC GLUCOSE mg/dl 88 78               Lines/Drains:  Invasive Devices     Peripheral Intravenous Line  Duration           Peripheral IV 05/05/23 Right Antecubital <1 day    Peripheral IV 05/06/23 Right;Ventral (anterior) Forearm <1 day          Drain  Duration           External Urinary Catheter <1 day                  Telemetry:  Telemetry Orders (From admission, onward)             24 Hour Telemetry Monitoring  Continuous x 24 Hours (Telem)        Expiring   References:    Telemetry Guidelines   Question:  Reason for 24 Hour Telemetry  Answer:  Metabolic/Electrolyte Disturbance with High Probability of Dysrhythmia (K level <3 or >6, or KCL infusion >10mEq/hr)                 Telemetry Reviewed: Normal Sinus Rhythm  Indication for Continued Telemetry Use: Metabolic/electrolyte disturbance with high probability of dysrhythmia             Imaging: Reviewed radiology reports from this admission including: chest xray and CT head    Recent Cultures (last 7 days):         Last 24 Hours Medication List:   Current Facility-Administered Medications   Medication Dose Route Frequency Provider Last Rate   • acetaminophen  650 mg Oral Q6H PRN Damon BROOKS MD     • albuterol  2 puff Inhalation Q6H PRN Damon BROOKS MD     • amitriptyline  25 mg Oral HS PRN Damon BROOKS MD     • aspirin  81 mg Oral Daily Damon BROOKS MD     • DULoxetine  60 mg Oral Daily Damon BROOKS MD     • heparin (porcine)  5,000 Units Subcutaneous Q8H [facility] Damon BROOKS MD     • insulin lispro  1-5 Units Subcutaneous TID AC Ivanna BROOKS MD • latanoprost  1 drop Both Eyes BID Laura Cosme PA-C     • oxyCODONE  5 mg Oral Q6H PRN Damon BROOKS MD     • pantoprazole  40 mg Oral Early Morning Damon BROOKS MD     • sodium chloride  100 mL/hr Intravenous Continuous Damon BROOKS  mL/hr (05/06/23 0319)   • sodium polystyrene  15 g Oral Once Aicha Bales PA-C     • timolol  1 drop Both Eyes BID Laura Cosme PA-C     • traMADol  50 mg Oral Q6H PRN Yary Reardon MD          Today, Patient Was Seen By: Ashley Castro PA-C    **Please Note: This note may have been constructed using a voice recognition system  **

## 2023-05-06 NOTE — MALNUTRITION/BMI
This medical record reflects one or more clinical indicators suggestive of malnutrition and/or morbid obesity  BMI Findings:  Adult BMI Classifications: Morbid Obesity 40-44 9        Body mass index is 42 77 kg/m²  See Nutrition note dated 5/6/2023 in flow sheets for additional details  Completed nutrition assessment is viewable in the nutrition documentation

## 2023-05-06 NOTE — PLAN OF CARE
Problem: Potential for Falls  Goal: Patient will remain free of falls  Description: INTERVENTIONS:  - Educate patient/family on patient safety including physical limitations  - Instruct patient to call for assistance with activity   - Consult OT/PT to assist with strengthening/mobility   - Keep Call bell within reach  - Keep bed low and locked with side rails adjusted as appropriate  - Keep care items and personal belongings within reach  - Initiate and maintain comfort rounds  - Make Fall Risk Sign visible to staff  - Offer Toileting every 2 Hours, in advance of need  - Initiate/Maintain bedalarm  - Obtain necessary fall risk management equipment:   - Apply yellow socks and bracelet for high fall risk patients  - Consider moving patient to room near nurses station  Outcome: Progressing     Problem: MOBILITY - ADULT  Goal: Maintain or return to baseline ADL function  Description: INTERVENTIONS:  -  Assess patient's ability to carry out ADLs; assess patient's baseline for ADL function and identify physical deficits which impact ability to perform ADLs (bathing, care of mouth/teeth, toileting, grooming, dressing, etc )  - Assess/evaluate cause of self-care deficits   - Assess range of motion  - Assess patient's mobility; develop plan if impaired  - Assess patient's need for assistive devices and provide as appropriate  - Encourage maximum independence but intervene and supervise when necessary  - Involve family in performance of ADLs  - Assess for home care needs following discharge   - Consider OT consult to assist with ADL evaluation and planning for discharge  - Provide patient education as appropriate  Outcome: Progressing  Goal: Maintains/Returns to pre admission functional level  Description: INTERVENTIONS:  - Perform BMAT or MOVE assessment daily    - Set and communicate daily mobility goal to care team and patient/family/caregiver     - Collaborate with rehabilitation services on mobility goals if consulted  - Perform Range of Motion 3 times a day  - Reposition patient every 2 hours    - Dangle patient 3 times a day  - Stand patient 3 times a day  - Ambulate patient 3 times a day  - Out of bed to chair 3 times a day   - Out of bed for meals 3 times a day  - Out of bed for toileting  - Record patient progress and toleration of activity level   Outcome: Progressing     Problem: PAIN - ADULT  Goal: Verbalizes/displays adequate comfort level or baseline comfort level  Description: Interventions:  - Encourage patient to monitor pain and request assistance  - Assess pain using appropriate pain scale  - Administer analgesics based on type and severity of pain and evaluate response  - Implement non-pharmacological measures as appropriate and evaluate response  - Consider cultural and social influences on pain and pain management  - Notify physician/advanced practitioner if interventions unsuccessful or patient reports new pain  Outcome: Progressing     Problem: INFECTION - ADULT  Goal: Absence or prevention of progression during hospitalization  Description: INTERVENTIONS:  - Assess and monitor for signs and symptoms of infection  - Monitor lab/diagnostic results  - Monitor all insertion sites, i e  indwelling lines, tubes, and drains  - Monitor endotracheal if appropriate and nasal secretions for changes in amount and color  - [place] appropriate cooling/warming therapies per order  - Administer medications as ordered  - Instruct and encourage patient and family to use good hand hygiene technique  - Identify and instruct in appropriate isolation precautions for identified infection/condition  Outcome: Progressing  Goal: Absence of fever/infection during neutropenic period  Description: INTERVENTIONS:  - Monitor WBC    Outcome: Progressing     Problem: SAFETY ADULT  Goal: Patient will remain free of falls  Description: INTERVENTIONS:  - Educate patient/family on patient safety including physical limitations  - Instruct patient to call for assistance with activity   - Consult OT/PT to assist with strengthening/mobility   - Keep Call bell within reach  - Keep bed low and locked with side rails adjusted as appropriate  - Keep care items and personal belongings within reach  - Initiate and maintain comfort rounds  - Make Fall Risk Sign visible to staff  - Offer Toileting every 2 Hours, in advance of need  - Initiate/Maintain bedalarm  - Obtain necessary fall risk management equipment:   - Apply yellow socks and bracelet for high fall risk patients  - Consider moving patient to room near nurses station  Outcome: Progressing  Goal: Maintain or return to baseline ADL function  Description: INTERVENTIONS:  -  Assess patient's ability to carry out ADLs; assess patient's baseline for ADL function and identify physical deficits which impact ability to perform ADLs (bathing, care of mouth/teeth, toileting, grooming, dressing, etc )  - Assess/evaluate cause of self-care deficits   - Assess range of motion  - Assess patient's mobility; develop plan if impaired  - Assess patient's need for assistive devices and provide as appropriate  - Encourage maximum independence but intervene and supervise when necessary  - Involve family in performance of ADLs  - Assess for home care needs following discharge   - Consider OT consult to assist with ADL evaluation and planning for discharge  - Provide patient education as appropriate  Outcome: Progressing  Goal: Maintains/Returns to pre admission functional level  Description: INTERVENTIONS:  - Perform BMAT or MOVE assessment daily    - Set and communicate daily mobility goal to care team and patient/family/caregiver  - Collaborate with rehabilitation services on mobility goals if consulted  - Perform Range of Motion 3 times a day  - Reposition patient every 2 hours    - Dangle patient 3 times a day  - Stand patient 3 times a day  - Ambulate patient 3 times a day  - Out of bed to chair 3 times a day   - Out of bed for meals 3 times a day  - Out of bed for toileting  - Record patient progress and toleration of activity level   Outcome: Progressing     Problem: DISCHARGE PLANNING  Goal: Discharge to home or other facility with appropriate resources  Description: INTERVENTIONS:  - Identify barriers to discharge w/patient and caregiver  - Arrange for needed discharge resources and transportation as appropriate  - Identify discharge learning needs (meds, wound care, etc )  - Arrange for interpretive services to assist at discharge as needed  - Refer to Case Management Department for coordinating discharge planning if the patient needs post-hospital services based on physician/advanced practitioner order or complex needs related to functional status, cognitive ability, or social support system  Outcome: Progressing     Problem: Knowledge Deficit  Goal: Patient/family/caregiver demonstrates understanding of disease process, treatment plan, medications, and discharge instructions  Description: Complete learning assessment and assess knowledge base    Interventions:  - Provide teaching at level of understanding  - Provide teaching via preferred learning methods  Outcome: Progressing

## 2023-05-06 NOTE — RESPIRATORY THERAPY NOTE
"RT Protocol Note  Damien Hook 81 y o  female MRN: [id number]  Unit/Bed#: -01 Encounter: [id number]    Assessment    Principal Problem:    Acute kidney injury superimposed on chronic kidney disease ([place] [address] )  Active Problems:    Hyperkalemia    Fall    Hidradenitis    DM (diabetes mellitus) ([place] [address] )    Anemia      Home Pulmonary Medications:  albuterol  Home Devices/Therapy: BiPAP/CPAP    Past Medical History:   Diagnosis Date   • Diabetes ([place] [address] )    • Hypertension    • Sciatica    • Stage 4 chronic kidney disease ([place] [address] )      Social History     Socioeconomic History   • Marital status:      Spouse name: None   • Number of children: None   • Years of education: None   • Highest education level: None   Occupational History   • None   Tobacco Use   • Smoking status: Never   • Smokeless tobacco: Never   Vaping Use   • Vaping Use: Never used   Substance and Sexual Activity   • Alcohol use: Not Currently   • Drug use: Yes     Types: Marijuana   • Sexual activity: Not Currently   Other Topics Concern   • None   Social History Narrative   • None     Social Determinants of Health     Financial Resource Strain: Not on file   Food Insecurity: Not on file   Transportation Needs: Not on file   Physical Activity: Not on file   Stress: Not on file   Social Connections: Not on file   Intimate Partner Violence: Not on file   Housing Stability: Not on file       Subjective    Subjective Data: continue with PRN per home regimen    Objective    Physical Exam:   General Appearance: Awake  Respiratory Pattern: Normal  Chest Assessment: Chest expansion symmetrical  Bilateral Breath Sounds: Clear, Diminished  Cough: None  O2 Device: v30 Snuffy    Vitals:  Blood pressure 118/67, pulse 100, temperature 97 6 °F (36 4 °C), resp  rate 20, height 5' 5\" (1 651 m), weight 117 kg (257 lb), SpO2 94 %  Imaging and other studies: I have personally reviewed pertinent reports        O2 Device: v30 Snuffy     Plan    Respiratory Plan: " Home Bronchodilator Patient pathway        Resp Comments: will continue with current tx plan

## 2023-05-07 LAB
25(OH)D3 SERPL-MCNC: 43.1 NG/ML (ref 30–100)
ANION GAP SERPL CALCULATED.3IONS-SCNC: 4 MMOL/L (ref 4–13)
ATRIAL RATE: 117 BPM
BUN SERPL-MCNC: 22 MG/DL (ref 5–25)
CALCIUM SERPL-MCNC: 9.4 MG/DL (ref 8.4–10.2)
CHLORIDE SERPL-SCNC: 104 MMOL/L (ref 96–108)
CO2 SERPL-SCNC: 25 MMOL/L (ref 21–32)
CREAT SERPL-MCNC: 1.53 MG/DL (ref 0.6–1.3)
ERYTHROCYTE [DISTWIDTH] IN BLOOD BY AUTOMATED COUNT: 16.2 % (ref 11.6–15.1)
FERRITIN SERPL-MCNC: 230 NG/ML (ref 8–388)
FOLATE SERPL-MCNC: 5.5 NG/ML (ref 3.1–17.5)
GFR SERPL CREATININE-BSD FRML MDRD: 34 ML/MIN/1.73SQ M
GLUCOSE SERPL-MCNC: 116 MG/DL (ref 65–140)
GLUCOSE SERPL-MCNC: 124 MG/DL (ref 65–140)
GLUCOSE SERPL-MCNC: 129 MG/DL (ref 65–140)
GLUCOSE SERPL-MCNC: 155 MG/DL (ref 65–140)
GLUCOSE SERPL-MCNC: 181 MG/DL (ref 65–140)
GLUCOSE SERPL-MCNC: 98 MG/DL (ref 65–140)
HCT VFR BLD AUTO: 32.5 % (ref 34.8–46.1)
HGB BLD-MCNC: 10 G/DL (ref 11.5–15.4)
IRON SATN MFR SERPL: 27 % (ref 15–50)
IRON SERPL-MCNC: 50 UG/DL (ref 50–170)
MCH RBC QN AUTO: 24.9 PG (ref 26.8–34.3)
MCHC RBC AUTO-ENTMCNC: 30.8 G/DL (ref 31.4–37.4)
MCV RBC AUTO: 81 FL (ref 82–98)
P AXIS: 35 DEGREES
PLATELET # BLD AUTO: 258 THOUSANDS/UL (ref 149–390)
PMV BLD AUTO: 9.6 FL (ref 8.9–12.7)
POTASSIUM SERPL-SCNC: 5 MMOL/L (ref 3.5–5.3)
PR INTERVAL: 170 MS
QRS AXIS: -8 DEGREES
QRSD INTERVAL: 76 MS
QT INTERVAL: 302 MS
QTC INTERVAL: 421 MS
RBC # BLD AUTO: 4.01 MILLION/UL (ref 3.81–5.12)
SODIUM SERPL-SCNC: 133 MMOL/L (ref 135–147)
T WAVE AXIS: 52 DEGREES
T4 FREE SERPL-MCNC: 1.02 NG/DL (ref 0.76–1.46)
TIBC SERPL-MCNC: 182 UG/DL (ref 250–450)
VENTRICULAR RATE: 117 BPM
VIT B12 SERPL-MCNC: 458 PG/ML (ref 100–900)
WBC # BLD AUTO: 9.48 THOUSAND/UL (ref 4.31–10.16)

## 2023-05-07 PROCEDURE — 94664 DEMO&/EVAL PT USE INHALER: CPT

## 2023-05-07 PROCEDURE — 85027 COMPLETE CBC AUTOMATED: CPT | Performed by: PHYSICIAN ASSISTANT

## 2023-05-07 PROCEDURE — 93010 ELECTROCARDIOGRAM REPORT: CPT | Performed by: INTERNAL MEDICINE

## 2023-05-07 PROCEDURE — 80048 BASIC METABOLIC PNL TOTAL CA: CPT | Performed by: PHYSICIAN ASSISTANT

## 2023-05-07 PROCEDURE — 94760 N-INVAS EAR/PLS OXIMETRY 1: CPT

## 2023-05-07 PROCEDURE — 82948 REAGENT STRIP/BLOOD GLUCOSE: CPT

## 2023-05-07 PROCEDURE — 94660 CPAP INITIATION&MGMT: CPT

## 2023-05-07 PROCEDURE — 99232 SBSQ HOSP IP/OBS MODERATE 35: CPT | Performed by: PHYSICIAN ASSISTANT

## 2023-05-07 RX ADMIN — TRAMADOL HYDROCHLORIDE 75 MG: 50 TABLET, COATED ORAL at 18:33

## 2023-05-07 RX ADMIN — TIMOLOL MALEATE 1 DROP: 5 SOLUTION/ DROPS OPHTHALMIC at 17:08

## 2023-05-07 RX ADMIN — HEPARIN SODIUM 5000 UNITS: 5000 INJECTION INTRAVENOUS; SUBCUTANEOUS at 05:24

## 2023-05-07 RX ADMIN — LATANOPROST 1 DROP: 50 SOLUTION OPHTHALMIC at 09:19

## 2023-05-07 RX ADMIN — LATANOPROST 1 DROP: 50 SOLUTION OPHTHALMIC at 21:23

## 2023-05-07 RX ADMIN — HEPARIN SODIUM 5000 UNITS: 5000 INJECTION INTRAVENOUS; SUBCUTANEOUS at 21:40

## 2023-05-07 RX ADMIN — DEXTROSE 100 ML/HR: 5 SOLUTION INTRAVENOUS at 04:07

## 2023-05-07 RX ADMIN — TIMOLOL MALEATE 1 DROP: 5 SOLUTION/ DROPS OPHTHALMIC at 09:19

## 2023-05-07 RX ADMIN — ASPIRIN 81 MG: 81 TABLET, CHEWABLE ORAL at 09:13

## 2023-05-07 RX ADMIN — DULOXETINE HYDROCHLORIDE 60 MG: 60 CAPSULE, DELAYED RELEASE ORAL at 09:13

## 2023-05-07 RX ADMIN — HEPARIN SODIUM 5000 UNITS: 5000 INJECTION INTRAVENOUS; SUBCUTANEOUS at 14:39

## 2023-05-07 RX ADMIN — TRAMADOL HYDROCHLORIDE 75 MG: 50 TABLET, COATED ORAL at 09:19

## 2023-05-07 NOTE — PLAN OF CARE
Problem: Potential for Falls  Goal: Patient will remain free of falls  Description: INTERVENTIONS:  - Educate patient/family on patient safety including physical limitations  - Instruct patient to call for assistance with activity   - Consult OT/PT to assist with strengthening/mobility   - Keep Call bell within reach  - Keep bed low and locked with side rails adjusted as appropriate  - Keep care items and personal belongings within reach  - Initiate and maintain comfort rounds  - Make Fall Risk Sign visible to staff  - Offer Toileting every 2 Hours, in advance of need  - Initiate/Maintain bedalarm  - Obtain necessary fall risk management equipment:   - Apply yellow socks and bracelet for high fall risk patients  - Consider moving patient to room near nurses station  Outcome: Progressing     Problem: MOBILITY - ADULT  Goal: Maintain or return to baseline ADL function  Description: INTERVENTIONS:  -  Assess patient's ability to carry out ADLs; assess patient's baseline for ADL function and identify physical deficits which impact ability to perform ADLs (bathing, care of mouth/teeth, toileting, grooming, dressing, etc )  - Assess/evaluate cause of self-care deficits   - Assess range of motion  - Assess patient's mobility; develop plan if impaired  - Assess patient's need for assistive devices and provide as appropriate  - Encourage maximum independence but intervene and supervise when necessary  - Involve family in performance of ADLs  - Assess for home care needs following discharge   - Consider OT consult to assist with ADL evaluation and planning for discharge  - Provide patient education as appropriate  Outcome: Progressing  Goal: Maintains/Returns to pre admission functional level  Description: INTERVENTIONS:  - Perform BMAT or MOVE assessment daily    - Set and communicate daily mobility goal to care team and patient/family/caregiver     - Collaborate with rehabilitation services on mobility goals if consulted  - Perform Range of Motion 3 times a day  - Reposition patient every 2 hours    - Dangle patient 3 times a day  - Stand patient 3 times a day  - Ambulate patient 3 times a day  - Out of bed to chair 3 times a day   - Out of bed for meals 3 times a day  - Out of bed for toileting  - Record patient progress and toleration of activity level   Outcome: Progressing     Problem: PAIN - ADULT  Goal: Verbalizes/displays adequate comfort level or baseline comfort level  Description: Interventions:  - Encourage patient to monitor pain and request assistance  - Assess pain using appropriate pain scale  - Administer analgesics based on type and severity of pain and evaluate response  - Implement non-pharmacological measures as appropriate and evaluate response  - Consider cultural and social influences on pain and pain management  - Notify physician/advanced practitioner if interventions unsuccessful or patient reports new pain  Outcome: Progressing     Problem: INFECTION - ADULT  Goal: Absence or prevention of progression during hospitalization  Description: INTERVENTIONS:  - Assess and monitor for signs and symptoms of infection  - Monitor lab/diagnostic results  - Monitor all insertion sites, i e  indwelling lines, tubes, and drains  - Monitor endotracheal if appropriate and nasal secretions for changes in amount and color  - [place] appropriate cooling/warming therapies per order  - Administer medications as ordered  - Instruct and encourage patient and family to use good hand hygiene technique  - Identify and instruct in appropriate isolation precautions for identified infection/condition  Outcome: Progressing  Goal: Absence of fever/infection during neutropenic period  Description: INTERVENTIONS:  - Monitor WBC    Outcome: Progressing     Problem: SAFETY ADULT  Goal: Patient will remain free of falls  Description: INTERVENTIONS:  - Educate patient/family on patient safety including physical limitations  - Instruct patient to call for assistance with activity   - Consult OT/PT to assist with strengthening/mobility   - Keep Call bell within reach  - Keep bed low and locked with side rails adjusted as appropriate  - Keep care items and personal belongings within reach  - Initiate and maintain comfort rounds  - Make Fall Risk Sign visible to staff  - Offer Toileting every 2 Hours, in advance of need  - Initiate/Maintain bedalarm  - Obtain necessary fall risk management equipment:   - Apply yellow socks and bracelet for high fall risk patients  - Consider moving patient to room near nurses station  Outcome: Progressing  Goal: Maintain or return to baseline ADL function  Description: INTERVENTIONS:  -  Assess patient's ability to carry out ADLs; assess patient's baseline for ADL function and identify physical deficits which impact ability to perform ADLs (bathing, care of mouth/teeth, toileting, grooming, dressing, etc )  - Assess/evaluate cause of self-care deficits   - Assess range of motion  - Assess patient's mobility; develop plan if impaired  - Assess patient's need for assistive devices and provide as appropriate  - Encourage maximum independence but intervene and supervise when necessary  - Involve family in performance of ADLs  - Assess for home care needs following discharge   - Consider OT consult to assist with ADL evaluation and planning for discharge  - Provide patient education as appropriate  Outcome: Progressing  Goal: Maintains/Returns to pre admission functional level  Description: INTERVENTIONS:  - Perform BMAT or MOVE assessment daily    - Set and communicate daily mobility goal to care team and patient/family/caregiver  - Collaborate with rehabilitation services on mobility goals if consulted  - Perform Range of Motion 3 times a day  - Reposition patient every 3 hours    - Dangle patient 3 times a day  - Stand patient 3 times a day  - Ambulate patient 3 times a day  - Out of bed to chair 3 times a day   - Out of bed for meals 3 times a day  - Out of bed for toileting  - Record patient progress and toleration of activity level   Outcome: Progressing     Problem: DISCHARGE PLANNING  Goal: Discharge to home or other facility with appropriate resources  Description: INTERVENTIONS:  - Identify barriers to discharge w/patient and caregiver  - Arrange for needed discharge resources and transportation as appropriate  - Identify discharge learning needs (meds, wound care, etc )  - Arrange for interpretive services to assist at discharge as needed  - Refer to Case Management Department for coordinating discharge planning if the patient needs post-hospital services based on physician/advanced practitioner order or complex needs related to functional status, cognitive ability, or social support system  Outcome: Progressing     Problem: Knowledge Deficit  Goal: Patient/family/caregiver demonstrates understanding of disease process, treatment plan, medications, and discharge instructions  Description: Complete learning assessment and assess knowledge base    Interventions:  - Provide teaching at level of understanding  - Provide teaching via preferred learning methods  Outcome: Progressing     Problem: Prexisting or High Potential for Compromised Skin Integrity  Goal: Skin integrity is maintained or improved  Description: INTERVENTIONS:  - Identify patients at risk for skin breakdown  - Assess and monitor skin integrity  - Assess and monitor nutrition and hydration status  - Monitor labs   - Assess for incontinence   - Turn and reposition patient  - Assist with mobility/ambulation  - Relieve pressure over bony prominences  - Avoid friction and shearing  - Provide appropriate hygiene as needed including keeping skin clean and dry  - Evaluate need for skin moisturizer/barrier cream  - Collaborate with interdisciplinary team   - Patient/family teaching  - Consider wound care consult   Outcome: Progressing     Problem: Nutrition/Hydration-ADULT  Goal: Nutrient/Hydration intake appropriate for improving, restoring or maintaining nutritional needs  Description: Monitor and assess patient's nutrition/hydration status for malnutrition  Collaborate with interdisciplinary team and initiate plan and interventions as ordered  Monitor patient's weight and dietary intake as ordered or per policy  Utilize nutrition screening tool and intervene as necessary  Determine patient's food preferences and provide high-protein, high-caloric foods as appropriate       INTERVENTIONS:  - Monitor oral intake, urinary output, labs, and treatment plans  - Assess nutrition and hydration status and recommend course of action  - Evaluate amount of meals eaten  - Assist patient with eating if necessary   - Allow adequate time for meals  - Recommend/ encourage appropriate diets, oral nutritional supplements, and vitamin/mineral supplements  - Order, calculate, and assess calorie counts as needed  - Recommend, monitor, and adjust tube feedings and TPN/PPN based on assessed needs  - Assess need for intravenous fluids  - Provide specific nutrition/hydration education as appropriate  - Include patient/family/caregiver in decisions related to nutrition  Outcome: Progressing

## 2023-05-07 NOTE — ASSESSMENT & PLAN NOTE
Presents status post 2 falls, losing her balance  Patient reports head strike without loss of consciousness    Question of slurred speech at times along with report of difficulty with memory lately    · CT head - negative  · Fall precautions, PT and OT recs for [facility] 80 noted  · Labs do reveal elevated TSH, mild leukocytosis, LEOLA, hyperkalemia  · See individual treatment plans below

## 2023-05-07 NOTE — ASSESSMENT & PLAN NOTE
· Noted with anemia w/o signs of active overt bleeding repeat   · EGD in 01/2023 noted with hiatal hernia, nonobstructive Schatzki's ring, erosive gastritis  · Colonoscopy 01/2023 report noted with diverticulosis, small 2 to 5 mm polyp was removed at the time    · Current hemoglobin stable 10s, suspect some hemodilution given IVF  · Iron panel noted, suspect secondary to CKD

## 2023-05-07 NOTE — RESPIRATORY THERAPY NOTE
"RT Protocol Note  Fernanda Zepeda 81 y o  female MRN: [id number]  Unit/Bed#: -01 Encounter: [id number]    Assessment    Principal Problem:    Acute kidney injury superimposed on chronic kidney disease ([place] [address] )  Active Problems:    Hyperkalemia    Fall    Hidradenitis    DM (diabetes mellitus) ([place] [address] )    Anemia      Home Pulmonary Medications:    Home Devices/Therapy: BiPAP/CPAP    Past Medical History:   Diagnosis Date   • Diabetes ([place] [address] )    • Hypertension    • Sciatica    • Stage 4 chronic kidney disease ([place] [address] )      Social History     Socioeconomic History   • Marital status:      Spouse name: None   • Number of children: None   • Years of education: None   • Highest education level: None   Occupational History   • None   Tobacco Use   • Smoking status: Never   • Smokeless tobacco: Never   Vaping Use   • Vaping Use: Never used   Substance and Sexual Activity   • Alcohol use: Not Currently   • Drug use: Yes     Types: Marijuana   • Sexual activity: Not Currently   Other Topics Concern   • None   Social History Narrative   • None     Social Determinants of Health     Financial Resource Strain: Not on file   Food Insecurity: Not on file   Transportation Needs: Not on file   Physical Activity: Not on file   Stress: Not on file   Social Connections: Not on file   Intimate Partner Violence: Not on file   Housing Stability: Not on file       Subjective    Subjective Data: sleeping    Objective    Physical Exam:   Assessment Type: Assess only  General Appearance: Sleeping  Respiratory Pattern: Spontaneous, Assisted  Chest Assessment: Chest expansion symmetrical  Bilateral Breath Sounds: Clear, Diminished  Cough: None  O2 Device: v30    Vitals:  Blood pressure 139/76, pulse 86, temperature 98 4 °F (36 9 °C), resp  rate 16, height 5' 5\" (1 651 m), weight 117 kg (257 lb), SpO2 93 %  Imaging and other studies: I have personally reviewed pertinent reports        O2 Device: v30     Plan    Respiratory Plan: " Home Bronchodilator Patient pathway        Resp Comments: will continue with albuterol inhaler

## 2023-05-07 NOTE — ASSESSMENT & PLAN NOTE
· Presented with hyperkalemia in setting of acute kidney injury  · EKG noted with normal sinus rhythm without acute changes  · Currently resolved, will continue to monitor

## 2023-05-07 NOTE — ASSESSMENT & PLAN NOTE
Baseline CKD stage III; cr around 1 6-1 7 range    Creatinine now back to baseline   · Presentating cr 2 65 with hyperkalemia 5 6  · Suspected secondary to recently started Bactrim on 05/01/2023 for mastitis  · Likely also secondary to poor oral intake  · Has since resolved with IV fluids

## 2023-05-07 NOTE — ASSESSMENT & PLAN NOTE
Lab Results   Component Value Date    HGBA1C 6 1 (H) 03/23/2023       Recent Labs     05/06/23  2108 05/07/23  0009 05/07/23  0511 05/07/23  0646   POCGLU 112 124 98 98       Blood Sugar Average: Last 72 hrs:  (P) 90 125   · Controlled a/e/b A1c  · Patient is on once weekly Ozempic - hold inpatient  · Hypoglycemia in the setting of poor oral intake, long 1/2 life of ozempic  · Continue D5 infusion until oral intake improved  · Hold all insulins   · BG checks ACHS

## 2023-05-07 NOTE — PLAN OF CARE
Problem: Potential for Falls  Goal: Patient will remain free of falls  Description: INTERVENTIONS:  - Educate patient/family on patient safety including physical limitations  - Instruct patient to call for assistance with activity   - Consult OT/PT to assist with strengthening/mobility   - Keep Call bell within reach  - Keep bed low and locked with side rails adjusted as appropriate  - Keep care items and personal belongings within reach  - Initiate and maintain comfort rounds  - Make Fall Risk Sign visible to staff  - Offer Toileting every 3 Hours, in advance of need  - Initiate/Maintain bed alarm  - Obtain necessary fall risk management equipment  - Apply yellow socks and bracelet for high fall risk patients  - Consider moving patient to room near nurses station  Outcome: Progressing     Problem: MOBILITY - ADULT  Goal: Maintain or return to baseline ADL function  Description: INTERVENTIONS:  -  Assess patient's ability to carry out ADLs; assess patient's baseline for ADL function and identify physical deficits which impact ability to perform ADLs (bathing, care of mouth/teeth, toileting, grooming, dressing, etc )  - Assess/evaluate cause of self-care deficits   - Assess range of motion  - Assess patient's mobility; develop plan if impaired  - Assess patient's need for assistive devices and provide as appropriate  - Encourage maximum independence but intervene and supervise when necessary  - Involve family in performance of ADLs  - Assess for home care needs following discharge   - Consider OT consult to assist with ADL evaluation and planning for discharge  - Provide patient education as appropriate  Outcome: Progressing  Goal: Maintains/Returns to pre admission functional level  Description: INTERVENTIONS:  - Perform BMAT or MOVE assessment daily    - Set and communicate daily mobility goal to care team and patient/family/caregiver     - Collaborate with rehabilitation services on mobility goals if consulted  - Perform Range of Motion 3 times a day  - Reposition patient every 3 hours    - Dangle patient 3 times a day  - Stand patient 3 times a day  - Ambulate patient 3 times a day  - Out of bed to chair 3 times a day   - Out of bed for meals 3 times a day  - Out of bed for toileting  - Record patient progress and toleration of activity level   Outcome: Progressing     Problem: PAIN - ADULT  Goal: Verbalizes/displays adequate comfort level or baseline comfort level  Description: Interventions:  - Encourage patient to monitor pain and request assistance  - Assess pain using appropriate pain scale  - Administer analgesics based on type and severity of pain and evaluate response  - Implement non-pharmacological measures as appropriate and evaluate response  - Consider cultural and social influences on pain and pain management  - Notify physician/advanced practitioner if interventions unsuccessful or patient reports new pain  Outcome: Progressing     Problem: INFECTION - ADULT  Goal: Absence or prevention of progression during hospitalization  Description: INTERVENTIONS:  - Assess and monitor for signs and symptoms of infection  - Monitor lab/diagnostic results  - Monitor all insertion sites, i e  indwelling lines, tubes, and drains  - Monitor endotracheal if appropriate and nasal secretions for changes in amount and color  - [place] appropriate cooling/warming therapies per order  - Administer medications as ordered  - Instruct and encourage patient and family to use good hand hygiene technique  - Identify and instruct in appropriate isolation precautions for identified infection/condition  Outcome: Progressing  Goal: Absence of fever/infection during neutropenic period  Description: INTERVENTIONS:  - Monitor WBC    Outcome: Progressing     Problem: SAFETY ADULT  Goal: Patient will remain free of falls  Description: INTERVENTIONS:  - Educate patient/family on patient safety including physical limitations  - Instruct patient to call for assistance with activity   - Consult OT/PT to assist with strengthening/mobility   - Keep Call bell within reach  - Keep bed low and locked with side rails adjusted as appropriate  - Keep care items and personal belongings within reach  - Initiate and maintain comfort rounds  - Make Fall Risk Sign visible to staff  - Offer Toileting every 3 Hours, in advance of need  - Initiate/Maintain bed alarm  - Obtain necessary fall risk management equipment  - Apply yellow socks and bracelet for high fall risk patients  - Consider moving patient to room near nurses station  Outcome: Progressing  Goal: Maintain or return to baseline ADL function  Description: INTERVENTIONS:  -  Assess patient's ability to carry out ADLs; assess patient's baseline for ADL function and identify physical deficits which impact ability to perform ADLs (bathing, care of mouth/teeth, toileting, grooming, dressing, etc )  - Assess/evaluate cause of self-care deficits   - Assess range of motion  - Assess patient's mobility; develop plan if impaired  - Assess patient's need for assistive devices and provide as appropriate  - Encourage maximum independence but intervene and supervise when necessary  - Involve family in performance of ADLs  - Assess for home care needs following discharge   - Consider OT consult to assist with ADL evaluation and planning for discharge  - Provide patient education as appropriate  Outcome: Progressing  Goal: Maintains/Returns to pre admission functional level  Description: INTERVENTIONS:  - Perform BMAT or MOVE assessment daily    - Set and communicate daily mobility goal to care team and patient/family/caregiver  - Collaborate with rehabilitation services on mobility goals if consulted  - Perform Range of Motion 3 times a day  - Reposition patient every 3 hours    - Dangle patient 3 times a day  - Stand patient 3 times a day  - Ambulate patient 3 times a day  - Out of bed to chair 3 times a day   - Out of bed for meals 3 times a day  - Out of bed for toileting  - Record patient progress and toleration of activity level   Outcome: Progressing     Problem: DISCHARGE PLANNING  Goal: Discharge to home or other facility with appropriate resources  Description: INTERVENTIONS:  - Identify barriers to discharge w/patient and caregiver  - Arrange for needed discharge resources and transportation as appropriate  - Identify discharge learning needs (meds, wound care, etc )  - Arrange for interpretive services to assist at discharge as needed  - Refer to Case Management Department for coordinating discharge planning if the patient needs post-hospital services based on physician/advanced practitioner order or complex needs related to functional status, cognitive ability, or social support system  Outcome: Progressing     Problem: Knowledge Deficit  Goal: Patient/family/caregiver demonstrates understanding of disease process, treatment plan, medications, and discharge instructions  Description: Complete learning assessment and assess knowledge base    Interventions:  - Provide teaching at level of understanding  - Provide teaching via preferred learning methods  Outcome: Progressing     Problem: Prexisting or High Potential for Compromised Skin Integrity  Goal: Skin integrity is maintained or improved  Description: INTERVENTIONS:  - Identify patients at risk for skin breakdown  - Assess and monitor skin integrity  - Assess and monitor nutrition and hydration status  - Monitor labs   - Assess for incontinence   - Turn and reposition patient  - Assist with mobility/ambulation  - Relieve pressure over bony prominences  - Avoid friction and shearing  - Provide appropriate hygiene as needed including keeping skin clean and dry  - Evaluate need for skin moisturizer/barrier cream  - Collaborate with interdisciplinary team   - Patient/family teaching  - Consider wound care consult   Outcome: Progressing     Problem: Nutrition/Hydration-ADULT  Goal: Nutrient/Hydration intake appropriate for improving, restoring or maintaining nutritional needs  Description: Monitor and assess patient's nutrition/hydration status for malnutrition  Collaborate with interdisciplinary team and initiate plan and interventions as ordered  Monitor patient's weight and dietary intake as ordered or per policy  Utilize nutrition screening tool and intervene as necessary  Determine patient's food preferences and provide high-protein, high-caloric foods as appropriate       INTERVENTIONS:  - Monitor oral intake, urinary output, labs, and treatment plans  - Assess nutrition and hydration status and recommend course of action  - Evaluate amount of meals eaten  - Assist patient with eating if necessary   - Allow adequate time for meals  - Recommend/ encourage appropriate diets, oral nutritional supplements, and vitamin/mineral supplements  - Order, calculate, and assess calorie counts as needed  - Recommend, monitor, and adjust tube feedings and TPN/PPN based on assessed needs  - Assess need for intravenous fluids  - Provide specific nutrition/hydration education as appropriate  - Include patient/family/caregiver in decisions related to nutrition  Outcome: Progressing

## 2023-05-07 NOTE — PROGRESS NOTES
[facility]  Progress Note  Name: Kelli Keller  MRN: [id number]  Unit/Bed#: -07 I Date of Admission: 5/5/2023   Date of Service: 5/7/2023 I Hospital Day: 2    Assessment/Plan   Fall  Assessment & Plan  Presents status post 2 falls, losing her balance  Patient reports head strike without loss of consciousness  Question of slurred speech at times along with report of difficulty with memory lately    · CT head - negative  · Fall precautions, PT and OT recs for [facility] noted  · Labs do reveal elevated TSH, mild leukocytosis, LEOLA, hyperkalemia  · See individual treatment plans below    * Acute kidney injury superimposed on chronic kidney disease ([facility] [address] )  Assessment & Plan  Baseline CKD stage III; cr around 1 6-1 7 range  Creatinine now back to baseline   · Presentating cr 2 65 with hyperkalemia 5 6  · Suspected secondary to recently started Bactrim on 05/01/2023 for mastitis  · Daily BMP - improved, continue IV fluids today   · Avoid nephrotoxic agent  · Monitor I/Os  · Urinary retention protocol  · Consider nephrology consultation    Hyperkalemia  Assessment & Plan  · Presented with hyperkalemia in setting of acute kidney injury  · EKG noted with normal sinus rhythm without acute changes  · Currently resolved, will continue to monitor     Anemia  Assessment & Plan  · Noted with anemia w/o signs of active overt bleeding repeat   · EGD in 01/2023 noted with hiatal hernia, nonobstructive Schatzki's ring, erosive gastritis  · Colonoscopy 01/2023 report noted with diverticulosis, small 2 to 5 mm polyp was removed at the time    · Current hemoglobin stable 10s, suspect some hemodilution given IVF  · Iron panel noted, suspect secondary to CKD    DM (diabetes mellitus) [facility])  Assessment & Plan  Lab Results   Component Value Date    HGBA1C 6 1 (H) 03/23/2023       Recent Labs     05/06/23  2108 05/07/23  0009 05/07/23  0511 05/07/23  0646   POCGLU 112 124 98 98       Blood Sugar Average: Last 72 hrs:  (P) 90 125   · Controlled a/e/b A1c  · Patient is on once weekly Ozempic - hold inpatient  · Hypoglycemia in the setting of poor oral intake, long 1/2 life of ozempic  · Continue D5 infusion until oral intake improved  · Hold all insulins   · BG checks ACHS    Hidradenitis  Assessment & Plan  · Patient has history of left breast hidradenitis  · Patient was started on Bactrim by PCP on 05/01/2023  · Presented with acute kidney injury, hyperkalemia likely due to Bactrim  · Noted no signs of infection on admission exam  · Stop Bactrim  · Follow-up with wound care consult  VTE Pharmacologic Prophylaxis: VTE Score: 4 Moderate Risk (Score 3-4) - Pharmacological DVT Prophylaxis Ordered: heparin  Patient Centered Rounds: I performed bedside rounds with nursing staff today  Discussions with Specialists or Other Care Team Provider:     Education and Discussions with Family / Patient: Patient declined call to   Total Time Spent on Date of Encounter in care of patient: 35 minutes This time was spent on one or more of the following: performing physical exam; counseling and coordination of care; obtaining or reviewing history; documenting in the medical record; reviewing/ordering tests, medications or procedures; communicating with other healthcare professionals and discussing with patient's family/caregivers  Current Length of Stay: 2 day(s)  Current Patient Status: Inpatient   Certification Statement: The patient will continue to require additional inpatient hospital stay due to pending wound evaluation, stable kidney numbers and BG without need for IVF  Discharge Plan: Anticipate discharge in 24-48 hrs to home with home services  Code Status: Level 1 - Full Code    Subjective:   Patient seen sitting up in bed, reports continued nausea, no further episodes of diarrhea  No fevers or chills presently, but felt cold last night  We reviewed her hypoglycemic episode yesterday  Patient does not recall everything from the day, but does report that she has been having difficulty with her memory along with stuttering  She has not had any slurred speech  Her mother did have dementia  Objective:     Vitals:   Temp (24hrs), Av 1 °F (36 7 °C), Min:97 6 °F (36 4 °C), Max:98 6 °F (37 °C)    Temp:  [97 6 °F (36 4 °C)-98 6 °F (37 °C)] 98 4 °F (36 9 °C)  HR:  [] 86  Resp:  [16-21] 16  BP: (118-160)/(66-99) 139/76  SpO2:  [92 %-99 %] 94 %  Body mass index is 42 77 kg/m²  Input and Output Summary (last 24 hours): Intake/Output Summary (Last 24 hours) at 2023 0750  Last data filed at 2023 9346  Gross per 24 hour   Intake 1560 ml   Output --   Net 1560 ml       Physical Exam:   Physical Exam  Vitals and nursing note reviewed  Constitutional:       General: She is not in acute distress  Appearance: Normal appearance  She is obese  She is not ill-appearing or toxic-appearing  Cardiovascular:      Rate and Rhythm: Normal rate and regular rhythm  Heart sounds: No murmur heard  Pulmonary:      Effort: Pulmonary effort is normal  No respiratory distress  Breath sounds: Normal breath sounds  No wheezing  Abdominal:      General: Bowel sounds are normal       Palpations: Abdomen is soft  Skin:     General: Skin is warm and dry  Neurological:      Mental Status: She is alert and oriented to person, place, and time     Psychiatric:         Mood and Affect: Mood normal          Behavior: Behavior normal            Additional Data:     Labs:  Results from last 7 days   Lab Units 23  0507 23  1047   WBC Thousand/uL 9 48 9 89   HEMOGLOBIN g/dL 10 0* 10 7*   HEMATOCRIT % 32 5* 34 8   PLATELETS Thousands/uL 258 280   NEUTROS PCT %  --  81*   LYMPHS PCT %  --  7*   MONOS PCT %  --  5   EOS PCT %  --  7*     Results from last 7 days   Lab Units 23  0507 23  1255   SODIUM mmol/L 133*   < > 135   POTASSIUM mmol/L 5 0   < > 5 6* CHLORIDE mmol/L 104   < > 107   CO2 mmol/L 25   < > 24   BUN mg/dL 22   < > 36*   CREATININE mg/dL 1 53*   < > 2 65*   ANION GAP mmol/L 4   < > 4   CALCIUM mg/dL 9 4   < > 9 2   ALBUMIN g/dL  --   --  3 5   TOTAL BILIRUBIN mg/dL  --   --  0 28   ALK PHOS U/L  --   --  108*   ALT U/L  --   --  6*   AST U/L  --   --  8*   GLUCOSE RANDOM mg/dL 98   < > 87    < > = values in this interval not displayed  Results from last 7 days   Lab Units 05/07/23  0646 05/07/23  0511 05/07/23  0009 05/06/23  2108 05/06/23  1943 05/06/23  1805 05/06/23  1705 05/06/23  1607 05/06/23  1540 05/06/23  1512 05/06/23  1459 05/06/23  1317   POC GLUCOSE mg/dl 98 98 124 112 118 112 99 146* 38* 56* 20* 93               Lines/Drains:  Invasive Devices     Peripheral Intravenous Line  Duration           Peripheral IV 05/07/23 Left;Ventral (anterior) Forearm <1 day          Drain  Duration           External Urinary Catheter 1 day                Imaging: No pertinent imaging reviewed      Recent Cultures (last 7 days):         Last 24 Hours Medication List:   Current Facility-Administered Medications   Medication Dose Route Frequency Provider Last Rate   • acetaminophen  650 mg Oral Q6H PRN Damon BROOKS MD     • albuterol  2 puff Inhalation Q6H PRN Damon BROOKS MD     • amitriptyline  25 mg Oral HS PRN Stephen BROOKS MD     • aspirin  81 mg Oral Daily Damon BROOKS MD     • dextrose  100 mL/hr Intravenous Continuous Jose Doan  mL/hr (05/07/23 0407)   • DULoxetine  60 mg Oral Daily Damon BROOKS MD     • heparin (porcine)  5,000 Units Subcutaneous Q8H [address] Damon BROOKS MD     • insulin lispro  1-5 Units Subcutaneous TID AC Damon BROOKS MD     • latanoprost  1 drop Both Eyes BID Laura Cosme PA-C     • ondansetron  4 mg Intravenous Q6H PRN Aicha Bales PA-C     • pantoprazole  40 mg Oral Early Morning Damon BROOKS MD     • timolol  1 drop Both Eyes BID Savannah Claudean Darter, PA-C • traMADol  75 mg Oral Q6H PRN Jacques Quinn PA-C     • trimethobenzamide  200 mg Intramuscular Q6H PRN Jacques Quinn PA-C          Today, Patient Was Seen By: Jacques Quinn PA-C    **Please Note: This note may have been constructed using a voice recognition system  **

## 2023-05-08 LAB
ANION GAP SERPL CALCULATED.3IONS-SCNC: 3 MMOL/L (ref 4–13)
BUN SERPL-MCNC: 16 MG/DL (ref 5–25)
CALCIUM SERPL-MCNC: 9.5 MG/DL (ref 8.4–10.2)
CHLORIDE SERPL-SCNC: 105 MMOL/L (ref 96–108)
CO2 SERPL-SCNC: 26 MMOL/L (ref 21–32)
CREAT SERPL-MCNC: 1.54 MG/DL (ref 0.6–1.3)
GFR SERPL CREATININE-BSD FRML MDRD: 33 ML/MIN/1.73SQ M
GLUCOSE SERPL-MCNC: 101 MG/DL (ref 65–140)
GLUCOSE SERPL-MCNC: 107 MG/DL (ref 65–140)
GLUCOSE SERPL-MCNC: 120 MG/DL (ref 65–140)
GLUCOSE SERPL-MCNC: 83 MG/DL (ref 65–140)
GLUCOSE SERPL-MCNC: 92 MG/DL (ref 65–140)
POTASSIUM SERPL-SCNC: 5.1 MMOL/L (ref 3.5–5.3)
SODIUM SERPL-SCNC: 134 MMOL/L (ref 135–147)

## 2023-05-08 PROCEDURE — 94760 N-INVAS EAR/PLS OXIMETRY 1: CPT

## 2023-05-08 PROCEDURE — 97110 THERAPEUTIC EXERCISES: CPT

## 2023-05-08 PROCEDURE — 94660 CPAP INITIATION&MGMT: CPT

## 2023-05-08 PROCEDURE — 82948 REAGENT STRIP/BLOOD GLUCOSE: CPT

## 2023-05-08 PROCEDURE — 80048 BASIC METABOLIC PNL TOTAL CA: CPT | Performed by: PHYSICIAN ASSISTANT

## 2023-05-08 PROCEDURE — 99232 SBSQ HOSP IP/OBS MODERATE 35: CPT | Performed by: INTERNAL MEDICINE

## 2023-05-08 PROCEDURE — 97116 GAIT TRAINING THERAPY: CPT

## 2023-05-08 PROCEDURE — 97166 OT EVAL MOD COMPLEX 45 MIN: CPT

## 2023-05-08 RX ORDER — TRAMADOL HYDROCHLORIDE 50 MG/1
100 TABLET ORAL EVERY 6 HOURS PRN
Status: DISCONTINUED | OUTPATIENT
Start: 2023-05-08 | End: 2023-05-10 | Stop reason: HOSPADM

## 2023-05-08 RX ADMIN — LATANOPROST 1 DROP: 50 SOLUTION OPHTHALMIC at 21:44

## 2023-05-08 RX ADMIN — HEPARIN SODIUM 5000 UNITS: 5000 INJECTION INTRAVENOUS; SUBCUTANEOUS at 21:44

## 2023-05-08 RX ADMIN — TIMOLOL MALEATE 1 DROP: 5 SOLUTION/ DROPS OPHTHALMIC at 08:52

## 2023-05-08 RX ADMIN — DULOXETINE HYDROCHLORIDE 60 MG: 60 CAPSULE, DELAYED RELEASE ORAL at 08:50

## 2023-05-08 RX ADMIN — ASPIRIN 81 MG: 81 TABLET, CHEWABLE ORAL at 08:50

## 2023-05-08 RX ADMIN — DEXTROSE 100 ML/HR: 5 SOLUTION INTRAVENOUS at 00:05

## 2023-05-08 RX ADMIN — HEPARIN SODIUM 5000 UNITS: 5000 INJECTION INTRAVENOUS; SUBCUTANEOUS at 14:32

## 2023-05-08 RX ADMIN — DEXTROSE 100 ML/HR: 5 SOLUTION INTRAVENOUS at 10:28

## 2023-05-08 RX ADMIN — TRAMADOL HYDROCHLORIDE 100 MG: 50 TABLET, COATED ORAL at 21:55

## 2023-05-08 RX ADMIN — TIMOLOL MALEATE 1 DROP: 5 SOLUTION/ DROPS OPHTHALMIC at 21:45

## 2023-05-08 RX ADMIN — AMITRIPTYLINE HYDROCHLORIDE 25 MG: 25 TABLET, FILM COATED ORAL at 21:54

## 2023-05-08 RX ADMIN — TRAMADOL HYDROCHLORIDE 75 MG: 50 TABLET, COATED ORAL at 08:50

## 2023-05-08 RX ADMIN — HEPARIN SODIUM 5000 UNITS: 5000 INJECTION INTRAVENOUS; SUBCUTANEOUS at 05:27

## 2023-05-08 RX ADMIN — TRAMADOL HYDROCHLORIDE 100 MG: 50 TABLET, COATED ORAL at 14:41

## 2023-05-08 NOTE — RESPIRATORY THERAPY NOTE
05/07/23 2309   Respiratory Protocol   Protocol Initiated? No   Protocol Selection Respiratory   Language Barrier? No   Medical & Social History Reviewed? Yes   Diagnostic Studies Reviewed? Yes   Physical Assessment Performed? Yes   Home Devices/Therapy BiPAP/CPAP   Respiratory Plan Home Bronchodilator Patient pathway   Respiratory Assessment   Assessment Type Assess only   General Appearance Alert; Awake   Respiratory Pattern Normal   Chest Assessment Chest expansion symmetrical   Bilateral Breath Sounds Diminished   R Breath Sounds Diminished   L Breath Sounds Diminished   Location Specific No   Cough None   Resp Comments Resp protocol completed  Will continue with current tx plan   O2 Device V30   Additional Assessments   Pulse 81   Respirations 17   SpO2 98 %     RT Ventilator Management Note  Parrish Moane 81 y o  female MRN: [id number]  Unit/Bed#: -01 Encounter: [id number]      Daily Screen    No data found in the last 10 encounters  Physical Exam:   Assessment Type: Assess only  General Appearance: Alert, Awake  Respiratory Pattern: Normal  Chest Assessment: Chest expansion symmetrical  Bilateral Breath Sounds: Diminished  R Breath Sounds: Diminished  L Breath Sounds: Diminished  Location Specific: No  Cough: None  O2 Device: V30      Resp Comments: Resp protocol completed   Will continue with current tx plan

## 2023-05-08 NOTE — PHYSICAL THERAPY NOTE
"PHYSICAL THERAPY TREATMENT  NAME:  Vinayak Galvan  DATE: 05/08/23    AGE:   81 y o  Mrn:   [id number]  ADMIT DX:  Hyperkalemia [E87 5]  Head injury [S09 90XA]  Fall in home, initial encounter [W19  XXXA, V88 837]  Acute kidney injury superimposed on chronic kidney disease ([facility] [address] ) [N17 9, N18 9]  Problem List:   Patient Active Problem List   Diagnosis    Acute kidney injury superimposed on chronic kidney disease ([facility] [address] )    Hyperkalemia    Fall    Hidradenitis    DM (diabetes mellitus) ([facility] )    Anemia       Past Medical History  Past Medical History:   Diagnosis Date    Diabetes ([place] [address] )     Hypertension     Sciatica     Stage 4 chronic kidney disease ([facility] )        Past Surgical History  History reviewed  No pertinent surgical history  Length Of Stay: 3  Performed at least 2 patient identifiers during session: Name and Birthday     05/08/23 1628   PT Last Visit   PT Visit Date 05/08/23   Note Type   Note Type Treatment   Pain Assessment   Pain Assessment Tool 0-10   Pain Score 9   Pain Location/Orientation Location: Back; Location: Buttocks   Pain Onset/Description Onset: Ongoing   Patient's Stated Pain Goal No pain   Hospital Pain Intervention(s) Repositioned; Ambulation/increased activity; Emotional support   Restrictions/Precautions   Weight Bearing Precautions Per Order No   Other Precautions Fall Risk;Pain;Multiple lines; Chair Alarm; Bed Alarm   General   Chart Reviewed Yes   Response to Previous Treatment Patient with no complaints from previous session  Family/Caregiver Present No   Cognition   Overall Cognitive Status WFL   Arousal/Participation Alert; Responsive; Cooperative   Attention Within functional limits   Orientation Level Oriented X4   Memory Within functional limits   Following Commands Follows all commands and directions without difficulty   Comments Pt agreeable to PT session   Subjective   Subjective \"I feel a little nauseaous\"   Bed Mobility   Sit to Supine 6  Modified independent   Additional items " "HOB elevated; Increased time required   Additional Comments BP at start of session 133/78   Transfers   Sit to Stand 5  Supervision   Additional items Assist x 1; Increased time required;Verbal cues   Stand to Sit 5  Supervision   Additional items Assist x 1; Increased time required;Verbal cues   Additional Comments rollator used during transfers   Ambulation/Elevation   Gait pattern Improper Weight shift;Decreased foot clearance; Forward Flexion; Shuffling;Decreased hip extension;Decreased heel strike   Gait Assistance 5  Supervision   Additional items Assist x 1;Verbal cues   Assistive Device 4-wheeled walker   Distance 12'   Stair Management Assistance Not tested   Balance   Static Sitting Good   Dynamic Sitting Fair +   Static Standing Fair +   Dynamic Standing Fair +   Ambulatory Fair +   Endurance Deficit   Endurance Deficit Yes   Endurance Deficit Description decreased activity tolerance   Activity Tolerance   Activity Tolerance Patient limited by fatigue; Other (Comment)  (nausea)   Nurse Made Aware RN Vikram Manley made aware of session outcomes and pts feeling of intermitent nausea   Exercises   Heelslides 10 reps;AROM; Bilateral;Supine   Hip Abduction 10 reps;AROM; Bilateral;Supine   Assessment   Prognosis Good   Problem List Decreased endurance; Impaired balance;Decreased mobility   Assessment Pt seen for PT treatment session this date, consisting of gt training on level surfaces to improve pt safety in household environment and therapeutic exercise  Since previous session, pt has made fair progress in terms of activity tolerance  Patient greeted seated in bedside chair stating she was just up to the bathroom \"getting cleaned up,\" and she started feeling nauseous, /78, RN notified and symptoms subsided within 3 minutes seated rest  Patient requesting to return to bed due to fatigue and completed STS supervision, and ambulated 12' with rollator supervision, pt completed sit > sup Oscar   PT instructed patient in " "supine TE, pt with good tolerance and states \"I try to do these at home too  \" Pertinent barriers during this session include nausea and fatigue  Current goals and POC remain appropriate, pt continues to have rehab potential and is making fair progress towards STGs  Pt prognosis for achieving goals is good, pending pt progress with hospitalization/medical status improvements, and indicated by ability to follow directions and motivation  Pt limited d/t avoidance behaviors and nausea  PT recommends home with home health rehabilitation upon discharge  Pt continues to be functioning below baseline level, and remains limited 2* factors listed above  PT will continue to see pt during current hospitalization in order to address the deficits listed above and provide interventions consistent w/ POC in effort to achieve STGs  Goals   Patient Goals to go home and feel better   STG Expiration Date 05/16/23   Short Term Goal #1 Pts goals remain appropriate  Continue with plan of care   PT Treatment Day 1   Plan   Treatment/Interventions Functional transfer training; Therapeutic exercise;Elevations; Endurance training;Patient/family training;Bed mobility;Gait training;Spoke to nursing   Progress Slow progress, decreased activity tolerance   PT Frequency 3-5x/wk   Recommendation   PT Discharge Recommendation Home with home health rehabilitation   [address] [place] [address] Mobility Inpatient   Turning in Flat Bed Without Bedrails 4   Lying on Back to Sitting on Edge of Flat Bed Without Bedrails 4   Moving Bed to Chair 3   Standing Up From Chair Using Arms 3   Walk in Room 3   Climb 3-5 Stairs With Railing 3   Basic Mobility Inpatient Raw Score 20   Basic Mobility Standardized Score 43 99   Highest Level Of Mobility   JH-HLM Goal 6: Walk 10 steps or more   JH-HLM Achieved 6: Walk 10 steps or more   Education   Education Provided Mobility training;Assistive device   Patient Demonstrates acceptance/verbal understanding   End of Consult   Patient " Position at End of Consult All needs within reach;Bed/Chair alarm activated;Supine     Time In: 0833  Time Out: 0856  Total Treatment Minutes: 23    Vick Montoya, PT

## 2023-05-08 NOTE — DISCHARGE INSTR - OTHER ORDERS
Skin care plans:  1-L breast and L buttock wounds- Cleanse with soap and water, pat dry  Apply Adaptic over wound bed and cover with Allevyn foam dressing  Change every 3 days and as needed for soilage/dislodgement   (Or can use hydrocolloid dressing and change every 3 days, or sooner if soiled/beginning to come off )

## 2023-05-08 NOTE — PLAN OF CARE
"  Problem: PHYSICAL THERAPY ADULT  Goal: Performs mobility at highest level of function for planned discharge setting  See evaluation for individualized goals  Description: Treatment/Interventions: Functional transfer training, Therapeutic exercise, Elevations, Endurance training, Patient/family training, Bed mobility, Gait training, Spoke to nursing          See flowsheet documentation for full assessment, interventions and recommendations  Outcome: Progressing  Note: Prognosis: Good  Problem List: Decreased endurance, Impaired balance, Decreased mobility  Assessment: Pt seen for PT treatment session this date, consisting of gt training on level surfaces to improve pt safety in household environment and therapeutic exercise  Since previous session, pt has made fair progress in terms of activity tolerance  Patient greeted seated in bedside chair stating she was just up to the bathroom \"getting cleaned up,\" and she started feeling nauseous, /78, RN notified and symptoms subsided within 3 minutes seated rest  Patient requesting to return to bed due to fatigue and completed STS supervision, and ambulated 12' with rollator supervision, pt completed sit > sup Oscar  PT instructed patient in supine TE, pt with good tolerance and states \"I try to do these at home too  \" Pertinent barriers during this session include nausea and fatigue  Current goals and POC remain appropriate, pt continues to have rehab potential and is making fair progress towards STGs  Pt prognosis for achieving goals is good, pending pt progress with hospitalization/medical status improvements, and indicated by ability to follow directions and motivation  Pt limited d/t avoidance behaviors and nausea  PT recommends home with home health rehabilitation upon discharge  Pt continues to be functioning below baseline level, and remains limited 2* factors listed above   PT will continue to see pt during current hospitalization in order to address the " deficits listed above and provide interventions consistent w/ POC in effort to achieve STGs  PT Discharge Recommendation: Home with home health rehabilitation    See flowsheet documentation for full assessment     Liliam Carbajal; PT, DPT

## 2023-05-08 NOTE — OCCUPATIONAL THERAPY NOTE
"Occupational Therapy Evaluation      Filomena Correa    5/8/2023    Principal Problem:    Acute kidney injury superimposed on chronic kidney disease ([facility] )  Active Problems:    Hyperkalemia    Fall    Hidradenitis    DM (diabetes mellitus) ([facility] )    Anemia      Past Medical History:   Diagnosis Date    Diabetes ([facility] )     Hypertension     Sciatica     Stage 4 chronic kidney disease ([facility] )        History reviewed  No pertinent surgical history  05/08/23 1522   OT Last Visit   OT Visit Date 05/08/23   Note Type   Note type Evaluation   Pain Assessment   Pain Assessment Tool 0-10   Pain Score 9   Pain Location/Orientation Orientation: Bilateral;Location: Buttocks; Location: Leg   Restrictions/Precautions   Weight Bearing Precautions Per Order No   Other Precautions Multiple lines; Fall Risk;Pain;Bed Alarm; Chair Alarm   Home Living   Type of [address] [place] [address] Two level;Bed/bath upstairs  (0 JOSHUA  FFOS to second floor with HR)   Bathroom Shower/Tub Walk-in shower   Bathroom Toilet Standard   Bathroom Equipment Shower chair;Grab bars in shower;Hand-held shower  (\"wooden stool in shower\")   [address] Walker;Cane  (rollator (2);canes (2))   Additional Comments Pt reports use of 4ww for ambulation; one upstairs and one downstairs   Prior Function   Level of [place] Independent with ADLs; Independent with functional mobility; Needs assistance with IADLS   Lives With Daughter;Family  (2 grandsons and dtr live with pt)   Receives Help From Family   IADLs Family/Friend/Other provides transportation; Family/Friend/Other provides meals; Independent with medication management  (cleaning and laundry with A)   Falls in the last 6 months 1 to 4  (2 per pt  \"I kept going to the left\")   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[employer])   Comments Prior to OT leaving, OT attempted to turn on bed alarm and pt stated \"Don't you turn that alarm, I know how to turn it off  \"   Lifestyle " Autonomy Pt reports PLOF was Ind with ADLS, A with IADLS, and (-) driving   Reciprocal Relationships dtr, 2 grandsons   ADL   Eating Assistance 6  Modified independent   Eating Deficit Increased time to complete   Grooming Assistance 6  Modified Independent   Grooming Deficit Increased time to complete   UB Bathing Assistance 6  Modified Independent   UB Bathing Deficit Increased time to complete   LB Bathing Assistance 4  Minimal Assistance   LB Bathing Deficit Increased time to complete;Supervision/safety;Verbal cueing;Steadying;Setup   UB Dressing Assistance 6  Modified independent   UB Dressing Deficit Increased time to complete;Supervision/safety;Verbal cueing;Steadying;Setup   LB Dressing Assistance 4  Minimal Assistance   LB Dressing Deficit Increased time to complete;Supervision/safety;Verbal cueing;Steadying;Setup   Toileting Assistance  5  Supervision/Setup   Toileting Deficit Increased time to complete;Supervison/safety;Verbal cueing;Steadying;Setup   Functional Assistance 4  Minimal Assistance   Functional Deficit Increased time to complete;Supervision/safety;Verbal cueing;Steadying;Setup   Bed Mobility   Supine to Sit 5  Supervision   Additional items Assist x 1; Increased time required;HOB elevated; Bedrails   Sit to Supine 5  Supervision   Additional items Assist x 1; Increased time required;HOB elevated; Bedrails   Transfers   Sit to Stand 5  Supervision   Additional items Assist x 1; Increased time required;Verbal cues; Bedrails   Stand to Sit 5  Supervision   Additional items Assist x 1; Increased time required;Verbal cues; Bedrails   Balance   Static Sitting Good   Dynamic Sitting Fair +   Static Standing Fair +   Dynamic Standing Fair +   Ambulatory Fair +   Activity Tolerance   Activity Tolerance Patient limited by fatigue; Other (Comment)  (nausea)   RUE Assessment   RUE Assessment   (ROM WFL; MMT 4/5)   LUE Assessment   LUE Assessment   (ROM WFL; MMT 4/5)   Hand Function   Gross Motor Coordination Functional   Fine Motor Coordination Functional   Sensation   Light Touch No apparent deficits   Sharp/Dull No apparent deficits   Stereognosis No apparent deficits   Proprioception   Proprioception No apparent deficits   Psychosocial   Psychosocial (WDL) WDL   Cognition   Overall Cognitive Status WFL   Arousal/Participation Alert; Responsive; Cooperative   Attention Within functional limits   Orientation Level Oriented X4   Memory Within functional limits   Following Commands Follows all commands and directions without difficulty   Comments Pt was agreeable to OT eval   Assessment   Limitation Decreased ADL status; Decreased endurance;Decreased high-level ADLs; Decreased self-care trans   Prognosis Good   Assessment Pt is a 81 y o  female seen for OT evaluation s/p admit to [facility] on 5/5/2023 w/ Acute kidney injury superimposed on chronic kidney disease ([facility] )  Comorbidities affecting pt's functional performance at time of assessment include:LEOLA, anemia, DM and fall history   Orders placed for OT evaluation and treatment  Performed at least two patient identifiers during session including name and wristband  Personal factors affecting pt at time of IE include:behavioral pattern, difficulty performing ADLS, difficulty performing IADLS , decreased initiation and engagement , health management  and environment  Prior to admission, pt reports Ind with ADLs, A with IADLs, and (-) driving  Upon evaluation: Pt requires MI with UB ADLs, min A with LB ADLs, and S with xfers 2* the following deficits impacting occupational performance: weakness, decreased dynamic sit/ stand balance, decreased activity tolerance, decreased standing tolerance time for self care and functional mobility, increased pain, environmental deficits, decreased coping skills, decreased mobilty and requiring external assistance to complete transitional movements   Pt to benefit from continued skilled OT tx while in the hospital to address deficits as defined above and maximize level of functional independence w ADL's and functional mobility  Occupational Performance areas to address include: bathing/shower, dressing, medication management, health maintenance, functional mobility, community mobility, clothing management and household maintenance  From OT standpoint, recommendation at time of d/c would be home OT  Plan   Treatment Interventions ADL retraining;Functional transfer training;UE strengthening/ROM; Endurance training;Patient/family training; Compensatory technique education; Activityengagement; Energy conservation   Goal Expiration Date 05/21/23   OT Frequency 2-3x/wk   Recommendation   OT Discharge Recommendation Home with home health rehabilitation   AM-PAC Daily Activity Inpatient   Lower Body Dressing 3   Bathing 3   Toileting 3   Upper Body Dressing 4   Grooming 4   Eating 4   Daily Activity Raw Score 21   Daily Activity Standardized Score (Calc for Raw Score >=11) 44 27   AM-PAC Applied Cognition Inpatient   Following a Speech/Presentation 4   Understanding Ordinary Conversation 4   Taking Medications 4   Remembering Where Things Are Placed or Put Away 4   Remembering List of 4-5 Errands 4   Taking Care of Complicated Tasks 4   Applied Cognition Raw Score 24   Applied Cognition Standardized Score 62 21     Occupational therapy Goals: In 5-7 days    Patient will verbalize and demonstrate use of energy conservation/ deep breathing technique and work simplification skills during functional activity with no verbal cues  Patient will verbalize and demonstrate good body mechanics and joint protection techniques during ADLs/ IADLs with no verbal cues     Patient will increase OOB/ sitting tolerance to 4-6 hours per day for increased participation in self care and leisure tasks with no s/s of exertion  Patient will identify s/s of exertion during ADL and functional mobility with no verbal cues       Patient will verbalize/ demonstrate compensatory strategies to recover from exertion with no verbal cues  Patient will increase standing tolerance time to 13-15 minutes with No UE support to complete sink level ADLs at modified independent level  Patient will increase sitting tolerance at edge of bed to 30-45 minutes to complete UB ADLs at modified independent level  Patient will demonstrate ability to safely perform LB ADLS with MI with long handled adaptive dressing equipment      Patient/ Family will demonstrate competency with UE Home Exercise Program        Suma Marcelo MS OTR/L

## 2023-05-08 NOTE — PLAN OF CARE
Problem: Potential for Falls  Goal: Patient will remain free of falls  Description: INTERVENTIONS:  - Educate patient/family on patient safety including physical limitations  - Instruct patient to call for assistance with activity   - Consult OT/PT to assist with strengthening/mobility   - Keep Call bell within reach  - Keep bed low and locked with side rails adjusted as appropriate  - Keep care items and personal belongings within reach  - Initiate and maintain comfort rounds  - Make Fall Risk Sign visible to staff  - Offer Toileting every 2 Hours, in advance of need  - Initiate/Maintain alarm  - Obtain necessary fall risk management equipment  - Apply yellow socks and bracelet for high fall risk patients  - Consider moving patient to room near nurses station  Outcome: Progressing     Problem: MOBILITY - ADULT  Goal: Maintain or return to baseline ADL function  Description: INTERVENTIONS:  -  Assess patient's ability to carry out ADLs; assess patient's baseline for ADL function and identify physical deficits which impact ability to perform ADLs (bathing, care of mouth/teeth, toileting, grooming, dressing, etc )  - Assess/evaluate cause of self-care deficits   - Assess range of motion  - Assess patient's mobility; develop plan if impaired  - Assess patient's need for assistive devices and provide as appropriate  - Encourage maximum independence but intervene and supervise when necessary  - Involve family in performance of ADLs  - Assess for home care needs following discharge   - Consider OT consult to assist with ADL evaluation and planning for discharge  - Provide patient education as appropriate  Outcome: Progressing  Goal: Maintains/Returns to pre admission functional level  Description: INTERVENTIONS:  - Perform BMAT or MOVE assessment daily    - Set and communicate daily mobility goal to care team and patient/family/caregiver     - Collaborate with rehabilitation services on mobility goals if consulted  - Perform Range of Motion 3 times a day  - Reposition patient every 2 hours    - Dangle patient 3 times a day  - Stand patient 3 times a day  - Ambulate patient 3 times a day  - Out of bed to chair 3 times a day   - Out of bed for meals 3 times a day  - Out of bed for toileting  - Record patient progress and toleration of activity level   Outcome: Progressing     Problem: PAIN - ADULT  Goal: Verbalizes/displays adequate comfort level or baseline comfort level  Description: Interventions:  - Encourage patient to monitor pain and request assistance  - Assess pain using appropriate pain scale  - Administer analgesics based on type and severity of pain and evaluate response  - Implement non-pharmacological measures as appropriate and evaluate response  - Consider cultural and social influences on pain and pain management  - Notify physician/advanced practitioner if interventions unsuccessful or patient reports new pain  Outcome: Progressing     Problem: INFECTION - ADULT  Goal: Absence or prevention of progression during hospitalization  Description: INTERVENTIONS:  - Assess and monitor for signs and symptoms of infection  - Monitor lab/diagnostic results  - Monitor all insertion sites, i e  indwelling lines, tubes, and drains  - Monitor endotracheal if appropriate and nasal secretions for changes in amount and color  - [place] appropriate cooling/warming therapies per order  - Administer medications as ordered  - Instruct and encourage patient and family to use good hand hygiene technique  - Identify and instruct in appropriate isolation precautions for identified infection/condition  Outcome: Progressing  Goal: Absence of fever/infection during neutropenic period  Description: INTERVENTIONS:  - Monitor WBC    Outcome: Progressing     Problem: SAFETY ADULT  Goal: Patient will remain free of falls  Description: INTERVENTIONS:  - Educate patient/family on patient safety including physical limitations  - Instruct patient to call for assistance with activity   - Consult OT/PT to assist with strengthening/mobility   - Keep Call bell within reach  - Keep bed low and locked with side rails adjusted as appropriate  - Keep care items and personal belongings within reach  - Initiate and maintain comfort rounds  - Make Fall Risk Sign visible to staff  - Offer Toileting every 2 Hours, in advance of need  - Initiate/Maintain alarm  - Obtain necessary fall risk management equipment  - Apply yellow socks and bracelet for high fall risk patients  - Consider moving patient to room near nurses station  Outcome: Progressing  Goal: Maintain or return to baseline ADL function  Description: INTERVENTIONS:  -  Assess patient's ability to carry out ADLs; assess patient's baseline for ADL function and identify physical deficits which impact ability to perform ADLs (bathing, care of mouth/teeth, toileting, grooming, dressing, etc )  - Assess/evaluate cause of self-care deficits   - Assess range of motion  - Assess patient's mobility; develop plan if impaired  - Assess patient's need for assistive devices and provide as appropriate  - Encourage maximum independence but intervene and supervise when necessary  - Involve family in performance of ADLs  - Assess for home care needs following discharge   - Consider OT consult to assist with ADL evaluation and planning for discharge  - Provide patient education as appropriate  Outcome: Progressing  Goal: Maintains/Returns to pre admission functional level  Description: INTERVENTIONS:  - Perform BMAT or MOVE assessment daily    - Set and communicate daily mobility goal to care team and patient/family/caregiver  - Collaborate with rehabilitation services on mobility goals if consulted  - Perform Range of Motion 3 times a day  - Reposition patient every 2 hours    - Dangle patient 3 times a day  - Stand patient 3 times a day  - Ambulate patient 3 times a day  - Out of bed to chair 3 times a day   - Out of bed for meals 3 times a day  - Out of bed for toileting  - Record patient progress and toleration of activity level   Outcome: Progressing     Problem: DISCHARGE PLANNING  Goal: Discharge to home or other facility with appropriate resources  Description: INTERVENTIONS:  - Identify barriers to discharge w/patient and caregiver  - Arrange for needed discharge resources and transportation as appropriate  - Identify discharge learning needs (meds, wound care, etc )  - Arrange for interpretive services to assist at discharge as needed  - Refer to Case Management Department for coordinating discharge planning if the patient needs post-hospital services based on physician/advanced practitioner order or complex needs related to functional status, cognitive ability, or social support system  Outcome: Progressing     Problem: Knowledge Deficit  Goal: Patient/family/caregiver demonstrates understanding of disease process, treatment plan, medications, and discharge instructions  Description: Complete learning assessment and assess knowledge base    Interventions:  - Provide teaching at level of understanding  - Provide teaching via preferred learning methods  Outcome: Progressing     Problem: Prexisting or High Potential for Compromised Skin Integrity  Goal: Skin integrity is maintained or improved  Description: INTERVENTIONS:  - Identify patients at risk for skin breakdown  - Assess and monitor skin integrity  - Assess and monitor nutrition and hydration status  - Monitor labs   - Assess for incontinence   - Turn and reposition patient  - Assist with mobility/ambulation  - Relieve pressure over bony prominences  - Avoid friction and shearing  - Provide appropriate hygiene as needed including keeping skin clean and dry  - Evaluate need for skin moisturizer/barrier cream  - Collaborate with interdisciplinary team   - Patient/family teaching  - Consider wound care consult   Outcome: Progressing     Problem: Nutrition/Hydration-ADULT  Goal: Nutrient/Hydration intake appropriate for improving, restoring or maintaining nutritional needs  Description: Monitor and assess patient's nutrition/hydration status for malnutrition  Collaborate with interdisciplinary team and initiate plan and interventions as ordered  Monitor patient's weight and dietary intake as ordered or per policy  Utilize nutrition screening tool and intervene as necessary  Determine patient's food preferences and provide high-protein, high-caloric foods as appropriate       INTERVENTIONS:  - Monitor oral intake, urinary output, labs, and treatment plans  - Assess nutrition and hydration status and recommend course of action  - Evaluate amount of meals eaten  - Assist patient with eating if necessary   - Allow adequate time for meals  - Recommend/ encourage appropriate diets, oral nutritional supplements, and vitamin/mineral supplements  - Order, calculate, and assess calorie counts as needed  - Recommend, monitor, and adjust tube feedings and TPN/PPN based on assessed needs  - Assess need for intravenous fluids  - Provide specific nutrition/hydration education as appropriate  - Include patient/family/caregiver in decisions related to nutrition  Outcome: Progressing

## 2023-05-08 NOTE — RESPIRATORY THERAPY NOTE
05/08/23 0400   Respiratory Assessment   Assessment Type Assess only   General Appearance Sleeping   Respiratory Pattern Normal   Chest Assessment Chest expansion symmetrical   Bilateral Breath Sounds Diminished   R Breath Sounds Diminished   L Breath Sounds Diminished   Location Specific No   Cough None   Resp Comments Pt remains on cpap   O2 Device V30   Non-Invasive Information   O2 Interface Device Face mask   Non-Invasive Ventilation Mode CPAP   $ Intermittent NIV Yes   SpO2 98 %   $ Pulse Oximetry Spot Check Charge Completed   Non-Invasive Settings   FiO2 (%) 21   Rise Time 2   Non-Invasive Readings   Skin Intervention Skin intact   Total Rate 20   Spontaneous Vt (mL) 411   Spontaneous MV (mL) 8   Leak (lpm) 30   Non-Invasive Alarms   Low Insp Pressure Time (sec) 60 sec   MV Low (L/min) 2   High Resp Rate (BPM) 40 BPM   Apnea Interval (sec) 30     RT Ventilator Management Note  Filomena Walden 81 y o  female MRN: [id number]  Unit/Bed#: -01 Encounter: [id number]      Daily Screen    No data found in the last 10 encounters             Physical Exam:   Assessment Type: Assess only  General Appearance: Sleeping  Respiratory Pattern: Normal  Chest Assessment: Chest expansion symmetrical  Bilateral Breath Sounds: Diminished  R Breath Sounds: Diminished  L Breath Sounds: Diminished  Location Specific: No  Cough: None  O2 Device: V30      Resp Comments: Pt remains on cpap

## 2023-05-08 NOTE — CASE MANAGEMENT
Case Management Assessment & Discharge Planning Note    Patient name Nelson Marcus  Location /-40 MRN [id number]  : 1950 Date 2023       Current Admission Date: 2023  Current Admission Diagnosis:Acute kidney injury superimposed on chronic kidney disease [facility])   Patient Active Problem List    Diagnosis Date Noted   • Acute kidney injury superimposed on chronic kidney disease ([facility] ) 2023   • Hyperkalemia 2023   • Fall 2023   • Hidradenitis 2023   • DM [facility]diabetes mellitus) ([facility] ) 2023   • Anemia 2023      LOS (days): 3  Geometric Mean LOS (GMLOS) (days):   Days to GMLOS:     OBJECTIVE:    Risk of Unplanned Readmission Score: 15 08         Current admission status: Inpatient       Preferred Pharmacy:   [address] [place] [address] #[id number] Jamaica Salazar [address] [place] [id number]  Phone: [phone] Fax: [phone]    Primary Care Provider: No primary care provider on file  Primary Insurance: [facility]  Secondary Insurance:     ASSESSMENT:  [address] [place] [address], [facility] Representative - Daughter   Primary Phone: [phone] (Mobile)               Advance Directives  Does patient have a [address] [facility] [address] [facility] [address]?: Yes  Does patient have Advance Directives?: Yes  Advance Directives: Power of  for health care  Primary Contact: Lg Oates [phone]         Readmission Root Cause  30 Day Readmission: No    Patient Information  Admitted from[de-identified] Home  Mental Status: Alert  During Assessment patient was accompanied by: Not accompanied during assessment  Assessment information provided by[de-identified] Patient  Primary Caregiver: Self  Support Systems: Self, Daughter  [place] of Residence: [place] [address] do you live in?: [place]  Home entry access options   Select all that apply : Garage  Type of Current Residence: 2 story home  Upon entering residence, is there a bedroom on the main floor (no further steps)?: Yes  Upon entering residence, is there a bathroom on the main floor (no further steps)?: Yes  In the last 12 months, was there a time when you were not able to pay the mortgage or rent on time?: No  In the last 12 months, how many places have you lived?: 1  In the last 12 months, was there a time when you did not have a steady place to sleep or slept in a shelter (including now)?: No  Homeless/housing insecurity resource given?: No  Living Arrangements: Lives w/ Daughter  Is patient a ?: No    Activities of Daily Living Prior to Admission  Functional Status: Assistance  Completes ADLs independently?: Yes  Ambulates independently?: No  Level of ambulatory dependence: Assistance  Does patient use assisted devices?: Yes  Assisted Devices (DME) used: Fatou Gale  Does patient currently own DME?: Yes  What DME does the patient currently own?: Fatou Johnson, Rollator, Shower Chair  Does patient have a history of Outpatient Therapy (PT/OT)?: No  Does the patient have a history of Short-Term Rehab?: No  Does patient have a history of HHC?: No  Does patient currently have [facility]?: No         Patient Information Continued  Income Source: SSI/SSD  Does patient have prescription coverage?: Yes  Within the past 12 months, you worried that your food would run out before you got the money to buy more : Never true  Within the past 12 months, the food you bought just didn't last and you didn't have money to get more : Never true  Food insecurity resource given?: No  Does patient receive dialysis treatments?: No  Does patient have a history of substance abuse?: No         Means of Transportation  [facility]] Family transport  In the past 12 months, has lack of transportation kept you from medical appointments or from getting medications?: No  In the past 12 months, has lack of transportation kept you from meetings, work, or from getting things needed for daily living?: No  Was application for public transport provided?: No        DISCHARGE DETAILS:    Discharge planning discussed with[de-identified] Pt at bedside  [place] of Choice: Yes  Comments - Freedom of Choice: CM met with pt at bedside and itroduced self/role  pt is alert and oriented x3 able to make her needs known and encouraged to do so  Pt states that she use a walker/ cane to ambulate  Pt has a walker, rollator, cane and showerchair at home that she uses  Pt states she has no hx of str ot hhc  Pt has her own bipap machine at home that she uses  Pt uses [facility] as her perfer pharmacy  Pt states that he dtr transports her to and from medical appointment  Pt [facility] MD Scot Naylor  Pt has a homecare reccomendation in which pt is open to  Hessel referral made  CM contiues to follow    CM contacted family/caregiver?: No- see comments (Pt will update her dtr)  Were Treatment Team discharge recommendations reviewed with patient/caregiver?: Yes  Did patient/caregiver verbalize understanding of patient care needs?: Yes  Were patient/caregiver advised of the risks associated with not following Treatment Team discharge recommendations?: Yes    Contacts  Reason/Outcome: Continuity of Care, Discharge [address]         Is the patient interested in [employer] at discharge?: Yes  [address] requested[de-identified] Nursing, Physical Therapy, Occupational [facility] Name[de-identified] Other (Hessel Referral made)  Home Health Services Needed[de-identified] Diabetes Management, Evaluate Functional Status and Safety, Gait/ADL Training, Strengthening/Theraputic Exercises to Improve Function  Homebound Criteria Met[de-identified] Requires the Assistance of Another Person for Safe Ambulation or to Leave the Home, Uses an Assist Device (i e  cane, walker, etc)  Supporting Clincal Findings[de-identified] Fatigues Easliy in [employer], Limited Endurance    DME Referral Provided  Referral made for DME?: No    Other Referral/Resources/Interventions Provided:  Interventions: C  Referral Comments: [place] Referal made of Jey Gavin    Would you like to participate in our [address] service program?  : No - Declined    Treatment Team Recommendation: Home with [address] [place] [address]  Discharge Destination Plan[de-identified] Home with [place] at Discharge : Family

## 2023-05-08 NOTE — RESPIRATORY THERAPY NOTE
05/07/23 2309   Respiratory Assessment   Assessment Type Assess only   General Appearance Alert; Awake   Respiratory Pattern Normal   Chest Assessment Chest expansion symmetrical   Bilateral Breath Sounds Diminished   R Breath Sounds Diminished   L Breath Sounds Diminished   Location Specific No   Cough None   Resp Comments Pt placed obn cpap at this time   O2 Device V30   Non-Invasive Information   O2 Interface Device Face mask   Non-Invasive Ventilation Mode CPAP   $ Intermittent NIV Yes   SpO2 98 %   $ Pulse Oximetry Spot Check Charge Completed   Non-Invasive Settings   FiO2 (%) 21   Rise Time 2   Non-Invasive Alarms   Low Insp Pressure Time (sec) 60 sec   MV Low (L/min) 2   High Resp Rate (BPM) 40 BPM   Apnea Interval (sec) 30     RT Ventilator Management Note  Filomena Swanson 81 y o  female MRN: [id number]  Unit/Bed#: -01 Encounter: [id number]      Daily Screen    No data found in the last 10 encounters             Physical Exam:   Assessment Type: Assess only  General Appearance: Alert, Awake  Respiratory Pattern: Normal  Chest Assessment: Chest expansion symmetrical  Bilateral Breath Sounds: Diminished  R Breath Sounds: Diminished  L Breath Sounds: Diminished  Location Specific: No  Cough: None  O2 Device: V30      Resp Comments: Pt placed obn cpap at this time

## 2023-05-08 NOTE — PROGRESS NOTES
[facility]  Progress Note  Name: Daniella Vaughan  MRN: [id number]  Unit/Bed#: -47 I Date of Admission: 5/5/2023   Date of Service: 5/8/2023 I Hospital Day: 3    Assessment/Plan   * Acute kidney injury superimposed on chronic kidney disease ([facility] )  Assessment & Plan  Baseline CKD stage III; cr around 1 6-1 7 range  Creatinine now back to baseline   · Presentating cr 2 65 with hyperkalemia 5 6  · Suspected secondary to recently started Bactrim on 05/01/2023 for mastitis  · Likely also secondary to poor oral intake  · Has since resolved with IV fluids    Anemia  Assessment & Plan  · Noted with anemia w/o signs of active overt bleeding repeat   · EGD in 01/2023 noted with hiatal hernia, nonobstructive Schatzki's ring, erosive gastritis  · Colonoscopy 01/2023 report noted with diverticulosis, small 2 to 5 mm polyp was removed at the time  · Current hemoglobin stable 10s, suspect some hemodilution given IVF  · Iron panel noted, suspect secondary to CKD    DM (diabetes mellitus) [facility])  Assessment & Plan  Lab Results   Component Value Date    HGBA1C 6 1 (H) 03/23/2023       Recent Labs     05/06/23  2108 05/07/23  0009 05/07/23  0511 05/07/23  0646   POCGLU 112 124 98 98       Blood Sugar Average: Last 72 hrs:  (P) 90 125   · Controlled a/e/b A1c  · Patient is on once weekly Ozempic - hold inpatient  · Hypoglycemia in the setting of poor oral intake, long 1/2 life of ozempic  · Continue D5 infusion until oral intake improved  · Hold all insulins   · BG checks ACHS    Hidradenitis  Assessment & Plan  · Patient has history of left breast hidradenitis  · Patient was started on Bactrim by PCP on 05/01/2023  · Presented with acute kidney injury, hyperkalemia likely due to Bactrim  · Noted no signs of infection on admission exam  · Stop Bactrim  · Follow-up with wound care consult  Fall  Assessment & Plan  Presents status post 2 falls, losing her balance    Patient reports head strike without loss of consciousness  Question of slurred speech at times along with report of difficulty with memory lately    · CT head - negative  · Fall precautions, PT and OT recs for [facility] noted  · Labs do reveal elevated TSH, mild leukocytosis, LEOLA, hyperkalemia  · See individual treatment plans below    Hyperkalemia  Assessment & Plan  · Presented with hyperkalemia in setting of acute kidney injury  · EKG noted with normal sinus rhythm without acute changes  · Currently resolved, will continue to monitor          VTE Pharmacologic Prophylaxis:   Pharmacologic: Heparin  Mechanical VTE Prophylaxis in Place: Yes    Patient Centered Rounds: I have performed bedside rounds with nursing staff today  Discussions with Specialists or Other Care Team Provider: cm, nursing    Education and Discussions with Family / Patient: pt    Time Spent for Care: 30 minutes  More than 50% of total time spent on counseling and coordination of care as described above  Current Length of Stay: 3 day(s)    Current Patient Status: Inpatient   Certification Statement: The patient will continue to require additional inpatient hospital stay due to See below     Discharge Plan: Still reports persistent dizziness if dizziness improved can likely be discharged tomorrow morning    Code Status: Level 1 - Full Code      Subjective:   Denies fevers, chills, cough, shortness of breath  Objective:     Vitals:   Temp (24hrs), Av 8 °F (36 6 °C), Min:97 3 °F (36 3 °C), Max:98 3 °F (36 8 °C)    Temp:  [97 3 °F (36 3 °C)-98 3 °F (36 8 °C)] 98 3 °F (36 8 °C)  HR:  [62-81] 69  Resp:  [16-17] 16  BP: ()/(55-81) 133/78  SpO2:  [93 %-98 %] 96 %  Body mass index is 42 77 kg/m²  Input and Output Summary (last 24 hours):        Intake/Output Summary (Last 24 hours) at 2023 1036  Last data filed at 2023 1028  Gross per 24 hour   Intake 3536 67 ml   Output 3250 ml   Net 286 67 ml       Physical Exam:     Physical Exam  Constitutional: General: She is not in acute distress  Appearance: She is well-developed  She is not diaphoretic  HENT:      Head: Normocephalic and atraumatic  Nose: Nose normal       Mouth/Throat:      Pharynx: No oropharyngeal exudate  Eyes:      General: No scleral icterus  Right eye: No discharge  Left eye: No discharge  Conjunctiva/sclera: Conjunctivae normal    Neck:      Thyroid: No thyromegaly  Vascular: No JVD  Cardiovascular:      Rate and Rhythm: Normal rate and regular rhythm  Heart sounds: Normal heart sounds  No murmur heard  No friction rub  No gallop  Pulmonary:      [place]: Pulmonary effort is normal  No respiratory distress  Breath sounds: Normal breath sounds  No wheezing or rales  Chest:      Chest wall: No tenderness  Abdominal:      General: Bowel sounds are normal  There is no distension  Palpations: Abdomen is soft  Tenderness: There is no abdominal tenderness  There is no guarding or rebound  Musculoskeletal:         General: No tenderness or deformity  Normal range of motion  Cervical back: Normal range of motion and neck supple  Skin:     General: Skin is warm and dry  Findings: No erythema or rash  Neurological:      Mental Status: She is alert  Mental status is at baseline  Cranial Nerves: No cranial nerve deficit  Sensory: No sensory deficit  Motor: No abnormal muscle tone        Coordination: Coordination normal            Additional Data:     Labs:    Results from last 7 days   Lab Units 05/07/23  0507 05/06/23  1047   WBC Thousand/uL 9 48 9 89   HEMOGLOBIN g/dL 10 0* 10 7*   HEMATOCRIT % 32 5* 34 8   PLATELETS Thousands/uL 258 280   NEUTROS PCT %  --  81*   LYMPHS PCT %  --  7*   MONOS PCT %  --  5   EOS PCT %  --  7*     Results from last 7 days   Lab Units 05/08/23  0519 05/05/23  2031 05/05/23  1255   SODIUM mmol/L 134*   < > 135   POTASSIUM mmol/L 5 1   < > 5 6*   CHLORIDE mmol/L 105   < > 107 CO2 mmol/L 26   < > 24   BUN mg/dL 16   < > 36*   CREATININE mg/dL 1 54*   < > 2 65*   ANION GAP mmol/L 3*   < > 4   CALCIUM mg/dL 9 5   < > 9 2   ALBUMIN g/dL  --   --  3 5   TOTAL BILIRUBIN mg/dL  --   --  0 28   ALK PHOS U/L  --   --  108*   ALT U/L  --   --  6*   AST U/L  --   --  8*   GLUCOSE RANDOM mg/dL 101   < > 87    < > = values in this interval not displayed  Results from last 7 days   Lab Units 05/08/23  0633 05/07/23  1557 05/07/23  1326 05/07/23  1119 05/07/23  0838 05/07/23  0646 05/07/23  0511 05/07/23  0009 05/06/23  2108 05/06/23  1943 05/06/23  1805 05/06/23  1705   POC GLUCOSE mg/dl 107 116 181* 129 155* 98 98 124 112 118 112 99                   * I Have Reviewed All Lab Data Listed Above  * Additional Pertinent Lab Tests Reviewed:  All Labs Within Last 24 Hours Reviewed    Imaging:    Imaging Reports Reviewed Today Include: na  Imaging Personally Reviewed by Myself Includes:  na    Recent Cultures (last 7 days):           Last 24 Hours Medication List:   Current Facility-Administered Medications   Medication Dose Route Frequency Provider Last Rate   • acetaminophen  650 mg Oral Q6H PRN Damon BROOKS MD     • albuterol  2 puff Inhalation Q6H PRN Damon BROOKS MD     • amitriptyline  25 mg Oral HS PRN Doroteo BROOKS MD     • aspirin  81 mg Oral Daily Damon BROOKS MD     • dextrose  100 mL/hr Intravenous Continuous Jose Doan  mL/hr (05/08/23 1028)   • DULoxetine  60 mg Oral Daily Damon BROOKS MD     • heparin (porcine)  5,000 Units Subcutaneous Q8H [address] Damon BROOKS MD     • insulin lispro  1-5 Units Subcutaneous TID AC Damon BROOKS MD     • latanoprost  1 drop Both Eyes BID Laura Cosme PA-C     • ondansetron  4 mg Intravenous Q6H PRN Aicha Bales PA-C     • pantoprazole  40 mg Oral Early Morning Damon BROOKS MD     • timolol  1 drop Both Eyes BID Laura Cosme PA-C     • traMADol  75 mg Oral Q6H PRN Keely Garcia PA-C • trimethobenzamide  200 mg Intramuscular Q6H PRN Yunior Quick PA-C          Today, Patient Was Seen By: Laura Miller MD    ** Please Note: Dictation voice to text software may have been used in the creation of this document   **

## 2023-05-08 NOTE — PLAN OF CARE
Problem: OCCUPATIONAL THERAPY ADULT  Goal: Performs self-care activities at highest level of function for planned discharge setting  See evaluation for individualized goals  Description: Treatment Interventions: ADL retraining, Functional transfer training, UE strengthening/ROM, Endurance training, Patient/family training, Compensatory technique education, Activityengagement, Energy conservation          See flowsheet documentation for full assessment, interventions and recommendations  Note: Limitation: Decreased ADL status, Decreased endurance, Decreased high-level ADLs, Decreased self-care trans  Prognosis: Good  Assessment: Pt is a 81 y o  female seen for OT evaluation s/p admit to [facility] on 5/5/2023 w/ Acute kidney injury superimposed on chronic kidney disease ([facility] )  Comorbidities affecting pt's functional performance at time of assessment include:LEOLA, anemia, DM and fall history   Orders placed for OT evaluation and treatment  Performed at least two patient identifiers during session including name and wristband  Personal factors affecting pt at time of IE include:behavioral pattern, difficulty performing ADLS, difficulty performing IADLS , decreased initiation and engagement , health management  and environment  Prior to admission, pt reports Ind with ADLs, A with IADLs, and (-) driving  Upon evaluation: Pt requires MI with UB ADLs, min A with LB ADLs, and S with xfers 2* the following deficits impacting occupational performance: weakness, decreased dynamic sit/ stand balance, decreased activity tolerance, decreased standing tolerance time for self care and functional mobility, increased pain, environmental deficits, decreased coping skills, decreased mobilty and requiring external assistance to complete transitional movements   Pt to benefit from continued skilled OT tx while in the hospital to address deficits as defined above and maximize level of functional independence w ADL's and functional mobility  Occupational Performance areas to address include: bathing/shower, dressing, medication management, health maintenance, functional mobility, community mobility, clothing management and household maintenance  From OT standpoint, recommendation at time of d/c would be home OT       OT Discharge Recommendation: Home with home health rehabilitation

## 2023-05-08 NOTE — WOUND OSTOMY CARE
Progress Note - Wound   Filomena Dawkins [place] 81 y o  female MRN: [id number]  Unit/Bed#: -01 Encounter: [id number]      Assessment:   Patient admitted due to LEOLA superimposed on CKD  History of diabetes, HTN, CKD, hidradenitis  Wound care consulted for left breast and sacral wounds  Patient agreeable to assessment, alert and oriented x4, continent of bowel and bladder, turns independently for assessment while in bed, is a light assist with care  Primary RN made aware of assessment  1  Left medial breast- patient states she has hidradenitis and get small open sores like this periodically  Wound is oval in shape, partial thickness, 100% dry pink tissue, no drainage noted on assessment  Priscilla-wound is dry, intact, hyperpigmented skin, no warmth, no induration, no redness  2  Left medial buttock- Appears to be a keloid with a friction related injury  Wound is round in shape, partial thickness, 100% blanchable pink tissue, no drainage noted on assessment  Priscilla-wound is dry, intact, blanchable  3  Bilateral sacrum, right buttock, hips, and heels- skin is dry, intact, blanchable  Educated patient on importance of frequent offloading of pressure via turning, repositioning, and weight-shifting  Verbalized understanding of plan of care  No induration, fluctuance, odor, warmth, redness, or purulence noted to the above noted wounds  New dressings applied  Patient tolerated well, reports tenderness pain to the wounds  Primary nurse aware of plan of care  See flow sheets for more detailed assessment findings  Will follow along  Skin care plans:  1-Hydraguard to bilateral sacrum, right buttock and heels BID and PRN  2-Elevate heels to offload pressure  3-Ehob cushion in chair when out of bed  4-Moisturize skin daily with skin nourishing cream   5-Turn/reposition q2h for pressure re-distribution on skin  6-L breast and L buttock wounds- Cleanse with soap and water, pat dry   Apply Adaptic over wound bed and cover with Allevyn foam dressing  Change every 3 days and as needed for soilage/dislodgement  Wound 05/08/23 Breast Left;Medial (Active)   Wound Image   05/08/23 1305   Wound Description Dry;Pink 05/08/23 1305   Priscilla-wound Assessment Dry; Intact; Hyperpigmented 05/08/23 1305   Wound Length (cm) 0 5 cm 05/08/23 1305   Wound Width (cm) 1 cm 05/08/23 1305   Wound Depth (cm) 0 1 cm 05/08/23 1305   Wound Surface Area (cm^2) 0 5 cm^2 05/08/23 1305   Wound Volume (cm^3) 0 05 cm^3 05/08/23 1305   Calculated Wound Volume (cm^3) 0 05 cm^3 05/08/23 1305   Tunneling 0 cm 05/08/23 1305   Undermining 0 05/08/23 1305   Drainage Amount None 05/08/23 1305   Non-staged Wound Description Partial thickness 05/08/23 1305   Treatments Cleansed;Site care 05/08/23 1305   Dressing Other (Comment); Foam, Silicon (eg  Allevyn, etc) 05/08/23 1305   Wound packed? No 05/08/23 1305   Dressing Changed Changed 05/08/23 1305   Patient Tolerance Tolerated well 05/08/23 1305   Dressing Status Clean;Dry; Intact 05/08/23 1305       Wound 05/08/23 Buttocks Left;Medial (Active)   Wound Image   05/08/23 1305   Wound Description Dry;Pink 05/08/23 1305   Priscilla-wound Assessment Dry; Intact 05/08/23 1305   Wound Length (cm) 0 5 cm 05/08/23 1305   Wound Width (cm) 0 5 cm 05/08/23 1305   Wound Depth (cm) 0 1 cm 05/08/23 1305   Wound Surface Area (cm^2) 0 25 cm^2 05/08/23 1305   Wound Volume (cm^3) 0 025 cm^3 05/08/23 1305   Calculated Wound Volume (cm^3) 0 03 cm^3 05/08/23 1305   Tunneling 0 cm 05/08/23 1305   Undermining 0 05/08/23 1305   Drainage Amount None 05/08/23 1305   Non-staged Wound Description Partial thickness 05/08/23 1305   Treatments Cleansed;Site care 05/08/23 1305   Dressing Other (Comment); Foam, Silicon (eg  Allevyn, etc) 05/08/23 1305   Wound packed? No 05/08/23 1305   Dressing Changed Changed 05/08/23 1305   Patient Tolerance Tolerated well 05/08/23 1305   Dressing Status Clean;Dry; Intact 05/08/23 1305     Call or tigertext with any questions  Wound Care will continue to follow    Seamus TOVARN RN CWON  Wound and Ostomy care

## 2023-05-08 NOTE — PLAN OF CARE
Problem: Potential for Falls  Goal: Patient will remain free of falls  Description: INTERVENTIONS:  - Educate patient/family on patient safety including physical limitations  - Instruct patient to call for assistance with activity   - Consult OT/PT to assist with strengthening/mobility   - Keep Call bell within reach  - Keep bed low and locked with side rails adjusted as appropriate  - Keep care items and personal belongings within reach  - Initiate and maintain comfort rounds  - Make Fall Risk Sign visible to staff  - Offer Toileting every 2  Hours, in advance of need  - Initiate/Maintain bed alarm  - Obtain necessary fall risk management equipment:    - Apply yellow socks and bracelet for high fall risk patients  - Consider moving patient to room near nurses station  Outcome: Progressing     Problem: MOBILITY - ADULT  Goal: Maintain or return to baseline ADL function  Description: INTERVENTIONS:  - Educate patient/family on patient safety including physical limitations  - Instruct patient to call for assistance with activity   - Consult OT/PT to assist with strengthening/mobility   - Keep Call bell within reach  - Keep bed low and locked with side rails adjusted as appropriate  - Keep care items and personal belongings within reach  - Initiate and maintain comfort rounds  - Make Fall Risk Sign visible to staff  - Offer Toileting every 2  Hours, in advance of need  - Initiate/Maintain bed alarm  - Obtain necessary fall risk management equipment:    - Apply yellow socks and bracelet for high fall risk patients  - Consider moving patient to room near nurses station  Outcome: Progressing

## 2023-05-09 LAB
ANION GAP SERPL CALCULATED.3IONS-SCNC: 5 MMOL/L (ref 4–13)
BASOPHILS # BLD AUTO: 0.06 THOUSANDS/ÂΜL (ref 0–0.1)
BASOPHILS NFR BLD AUTO: 1 % (ref 0–1)
BUN SERPL-MCNC: 21 MG/DL (ref 5–25)
CALCIUM SERPL-MCNC: 9.6 MG/DL (ref 8.4–10.2)
CHLORIDE SERPL-SCNC: 103 MMOL/L (ref 96–108)
CO2 SERPL-SCNC: 25 MMOL/L (ref 21–32)
CREAT SERPL-MCNC: 1.62 MG/DL (ref 0.6–1.3)
EOSINOPHIL # BLD AUTO: 0.72 THOUSAND/ÂΜL (ref 0–0.61)
EOSINOPHIL NFR BLD AUTO: 6 % (ref 0–6)
ERYTHROCYTE [DISTWIDTH] IN BLOOD BY AUTOMATED COUNT: 16.4 % (ref 11.6–15.1)
GFR SERPL CREATININE-BSD FRML MDRD: 31 ML/MIN/1.73SQ M
GLUCOSE SERPL-MCNC: 82 MG/DL (ref 65–140)
GLUCOSE SERPL-MCNC: 84 MG/DL (ref 65–140)
GLUCOSE SERPL-MCNC: 94 MG/DL (ref 65–140)
GLUCOSE SERPL-MCNC: 94 MG/DL (ref 65–140)
GLUCOSE SERPL-MCNC: 95 MG/DL (ref 65–140)
HCT VFR BLD AUTO: 33.5 % (ref 34.8–46.1)
HGB BLD-MCNC: 10.4 G/DL (ref 11.5–15.4)
IMM GRANULOCYTES # BLD AUTO: 0.06 THOUSAND/UL (ref 0–0.2)
IMM GRANULOCYTES NFR BLD AUTO: 1 % (ref 0–2)
LYMPHOCYTES # BLD AUTO: 2.81 THOUSANDS/ÂΜL (ref 0.6–4.47)
LYMPHOCYTES NFR BLD AUTO: 24 % (ref 14–44)
MCH RBC QN AUTO: 25.3 PG (ref 26.8–34.3)
MCHC RBC AUTO-ENTMCNC: 31 G/DL (ref 31.4–37.4)
MCV RBC AUTO: 82 FL (ref 82–98)
MONOCYTES # BLD AUTO: 1.42 THOUSAND/ÂΜL (ref 0.17–1.22)
MONOCYTES NFR BLD AUTO: 12 % (ref 4–12)
NEUTROPHILS # BLD AUTO: 6.84 THOUSANDS/ÂΜL (ref 1.85–7.62)
NEUTS SEG NFR BLD AUTO: 56 % (ref 43–75)
NRBC BLD AUTO-RTO: 0 /100 WBCS
PLATELET # BLD AUTO: 252 THOUSANDS/UL (ref 149–390)
PMV BLD AUTO: 9.3 FL (ref 8.9–12.7)
POTASSIUM SERPL-SCNC: 4.9 MMOL/L (ref 3.5–5.3)
RBC # BLD AUTO: 4.11 MILLION/UL (ref 3.81–5.12)
SODIUM SERPL-SCNC: 133 MMOL/L (ref 135–147)
WBC # BLD AUTO: 11.91 THOUSAND/UL (ref 4.31–10.16)

## 2023-05-09 PROCEDURE — 82948 REAGENT STRIP/BLOOD GLUCOSE: CPT

## 2023-05-09 PROCEDURE — 85025 COMPLETE CBC W/AUTO DIFF WBC: CPT | Performed by: INTERNAL MEDICINE

## 2023-05-09 PROCEDURE — 94760 N-INVAS EAR/PLS OXIMETRY 1: CPT

## 2023-05-09 PROCEDURE — 80048 BASIC METABOLIC PNL TOTAL CA: CPT | Performed by: INTERNAL MEDICINE

## 2023-05-09 PROCEDURE — 99233 SBSQ HOSP IP/OBS HIGH 50: CPT | Performed by: INTERNAL MEDICINE

## 2023-05-09 RX ADMIN — DULOXETINE HYDROCHLORIDE 60 MG: 60 CAPSULE, DELAYED RELEASE ORAL at 09:06

## 2023-05-09 RX ADMIN — LATANOPROST 1 DROP: 50 SOLUTION OPHTHALMIC at 20:45

## 2023-05-09 RX ADMIN — TRAMADOL HYDROCHLORIDE 100 MG: 50 TABLET, COATED ORAL at 20:55

## 2023-05-09 RX ADMIN — TIMOLOL MALEATE 1 DROP: 5 SOLUTION/ DROPS OPHTHALMIC at 20:44

## 2023-05-09 RX ADMIN — ASPIRIN 81 MG: 81 TABLET, CHEWABLE ORAL at 09:06

## 2023-05-09 RX ADMIN — TIMOLOL MALEATE 1 DROP: 5 SOLUTION/ DROPS OPHTHALMIC at 09:07

## 2023-05-09 RX ADMIN — HEPARIN SODIUM 5000 UNITS: 5000 INJECTION INTRAVENOUS; SUBCUTANEOUS at 21:00

## 2023-05-09 RX ADMIN — AMITRIPTYLINE HYDROCHLORIDE 25 MG: 25 TABLET, FILM COATED ORAL at 23:34

## 2023-05-09 RX ADMIN — HEPARIN SODIUM 5000 UNITS: 5000 INJECTION INTRAVENOUS; SUBCUTANEOUS at 05:15

## 2023-05-09 RX ADMIN — TRAMADOL HYDROCHLORIDE 100 MG: 50 TABLET, COATED ORAL at 09:06

## 2023-05-09 NOTE — PROGRESS NOTES
[facility]  Progress Note  Name: Radha Brown  MRN: [id number]  Unit/Bed#: -74 I Date of Admission: 5/5/2023   Date of Service: 5/9/2023 I Hospital Day: 4    Assessment/Plan   * Acute kidney injury superimposed on chronic kidney disease ([facility] [address] )  Assessment & Plan  Baseline CKD stage III; cr around 1 6-1 7 range  Creatinine now back to baseline   · Presentating cr 2 65 with hyperkalemia 5 6  · Suspected secondary to recently started Bactrim on 05/01/2023 for mastitis  · Likely also secondary to poor oral intake  · Has since improved with IV fluids  · Monitor renal function and if remains stable tomorrow then will discharge tomorrow am     Anemia  Assessment & Plan  · Noted with anemia w/o signs of active overt bleeding repeat   · EGD in 01/2023 noted with hiatal hernia, nonobstructive Schatzki's ring, erosive gastritis  · Colonoscopy 01/2023 report noted with diverticulosis, small 2 to 5 mm polyp was removed at the time  · Current hemoglobin stable 10s, suspect some hemodilution given IVF  · Iron panel noted, suspect secondary to CKD    DM (diabetes mellitus) [facility])  Assessment & Plan  Lab Results   Component Value Date    HGBA1C 6 1 (H) 03/23/2023       Recent Labs     05/08/23  1553 05/08/23  2057 05/09/23  0648 05/09/23  1030   POCGLU 92 83 95 84       Blood Sugar Average: Last 72 hrs:  (P) 101 04   · Controlled a/e/b A1c  · Patient is on once weekly Ozempic - hold inpatient  · Hypoglycemia in the setting of poor oral intake, long 1/2 life of ozempic  · Continue D5 infusion until oral intake improved  · Hold all insulins   · BG checks ACHS    Hidradenitis  Assessment & Plan  · Patient has history of left breast hidradenitis  · Patient was started on Bactrim by PCP on 05/01/2023  · Presented with acute kidney injury, hyperkalemia likely due to Bactrim  · Noted no signs of infection on admission exam  · Stop Bactrim  · Follow-up with wound care consult      Fall  Assessment & Plan  Presents status post 2 falls, losing her balance  Patient reports head strike without loss of consciousness  Question of slurred speech at times along with report of difficulty with memory lately    · CT head - negative  · Fall precautions, PT and OT recs for Jey Gavin noted  · Labs do reveal elevated TSH, mild leukocytosis, LEOLA, hyperkalemia  · See individual treatment plans below    Hyperkalemia  Assessment & Plan  · Presented with hyperkalemia in setting of acute kidney injury  · EKG noted with normal sinus rhythm without acute changes  · Currently resolved, will continue to monitor            VTE Pharmacologic Prophylaxis:   Pharmacologic: Heparin  Mechanical VTE Prophylaxis in Place: Yes    Review of Systems:    Review of Systems   Constitutional: Negative for chills and fever  HENT: Negative for ear pain and sore throat  Eyes: Negative for pain and visual disturbance  Respiratory: Negative for cough and shortness of breath  Cardiovascular: Negative for chest pain and palpitations  Gastrointestinal: Negative for abdominal pain and vomiting  Genitourinary: Negative for dysuria and hematuria  Musculoskeletal: Positive for back pain  Negative for arthralgias  Skin: Negative for color change and rash  Neurological: Negative for seizures and syncope  All other systems reviewed and are negative  Past Medical and Surgical History:     Past Medical History:   Diagnosis Date   • Diabetes ([facility] [address] )    • Hypertension    • Sciatica    • Stage 4 chronic kidney disease ([facility] [address] )        History reviewed  No pertinent surgical history  Patient Centered Rounds: I have performed bedside rounds with nursing staff today  Discussions with Specialists or Other Care Team Provider: nursing  CM    Education and Discussions with Family / Patient: patient    Time Spent for Care: 28  More than 50% of total time spent on counseling and coordination of care as described above      Current Length of Stay: 4 day(s)    Current Patient Status: Inpatient   Certification Statement: The patient will continue to require additional inpatient hospital stay due to renal function monitoring     Discharge Plan: tomorrow     Code Status: Level 1 - Full Code      Subjective:   No acute events     Objective:     Vitals:   No data recorded  HR:  [81-92] 81  Resp:  [17-18] 17  BP: (112-122)/(54-58) 122/58  SpO2:  [93 %-96 %] 94 %  Body mass index is 42 77 kg/m²  Input and Output Summary (last 24 hours): Intake/Output Summary (Last 24 hours) at 5/9/2023 1056  Last data filed at 5/9/2023 0906  Gross per 24 hour   Intake 1140 ml   Output --   Net 1140 ml       Physical Exam:     Physical Exam  Vitals and nursing note reviewed  Constitutional:       General: She is not in acute distress  Appearance: She is well-developed  She is not ill-appearing or diaphoretic  HENT:      Head: Normocephalic and atraumatic  Mouth/Throat:      Mouth: Mucous membranes are moist       Pharynx: Oropharynx is clear  Eyes:      General: No scleral icterus  Conjunctiva/sclera: Conjunctivae normal    Cardiovascular:      Rate and Rhythm: Normal rate and regular rhythm  Heart sounds: No murmur heard  Pulmonary:      Effort: Pulmonary effort is normal  No respiratory distress  Breath sounds: Normal breath sounds  No wheezing or rales  Abdominal:      General: Bowel sounds are normal       Palpations: Abdomen is soft  Tenderness: There is no abdominal tenderness  Musculoskeletal:         General: No swelling  Cervical back: Normal range of motion and neck supple  Right lower leg: No edema  Left lower leg: No edema  Skin:     General: Skin is warm and dry  Capillary Refill: Capillary refill takes less than 2 seconds  Neurological:      General: No focal deficit present  Mental Status: She is alert and oriented to person, place, and time     Psychiatric:         Mood and Affect: Mood normal          Behavior: Behavior normal            Additional Data:     Labs:    Results from last 7 days   Lab Units 05/09/23  0457   WBC Thousand/uL 11 91*   HEMOGLOBIN g/dL 10 4*   HEMATOCRIT % 33 5*   PLATELETS Thousands/uL 252   NEUTROS PCT % 56   LYMPHS PCT % 24   MONOS PCT % 12   EOS PCT % 6     Results from last 7 days   Lab Units 05/09/23  0457 05/05/23  2031 05/05/23  1255   SODIUM mmol/L 133*   < > 135   POTASSIUM mmol/L 4 9   < > 5 6*   CHLORIDE mmol/L 103   < > 107   CO2 mmol/L 25   < > 24   BUN mg/dL 21   < > 36*   CREATININE mg/dL 1 62*   < > 2 65*   ANION GAP mmol/L 5   < > 4   CALCIUM mg/dL 9 6   < > 9 2   ALBUMIN g/dL  --   --  3 5   TOTAL BILIRUBIN mg/dL  --   --  0 28   ALK PHOS U/L  --   --  108*   ALT U/L  --   --  6*   AST U/L  --   --  8*   GLUCOSE RANDOM mg/dL 94   < > 87    < > = values in this interval not displayed  Results from last 7 days   Lab Units 05/09/23  1030 05/09/23  0648 05/08/23  2057 05/08/23  1553 05/08/23  1051 05/08/23  0633 05/07/23  1557 05/07/23  1326 05/07/23  1119 05/07/23  0838 05/07/23  0646 05/07/23  0511   POC GLUCOSE mg/dl 84 95 83 92 120 107 116 181* 129 155* 98 98                   * I Have Reviewed All Lab Data Listed Above  * Additional Pertinent Lab Tests Reviewed:  [address] Admission Reviewed    Imaging:    Imaging Reports Reviewed Today Include: CXR  Imaging Personally Reviewed by Myself Includes:  none    Recent Cultures (last 7 days):           Last 24 Hours Medication List:   Current Facility-Administered Medications   Medication Dose Route Frequency Provider Last Rate   • acetaminophen  650 mg Oral Q6H PRN Damon BROOKS MD     • albuterol  2 puff Inhalation Q6H PRN Damon BROOKS MD     • amitriptyline  25 mg Oral HS PRN Jose Roberto BROOKS MD     • aspirin  81 mg Oral Daily Damon BROOKS MD     • DULoxetine  60 mg Oral Daily Damon BROOKS MD     • heparin (porcine)  5,000 Units Subcutaneous Q8H [address] Damon Leal MD TODD     • insulin lispro  1-5 Units Subcutaneous TID AC Damon BROOKS MD     • latanoprost  1 drop Both Eyes BID Laura Spencer PA-C     • ondansetron  4 mg Intravenous Q6H PRN Aicha Bales PA-C     • pantoprazole  40 mg Oral Early Morning Damon BROOKS MD     • timolol  1 drop Both Eyes BID Laura Cosme PA-C     • traMADol  100 mg Oral Q6H PRN Jose Doan MD     • trimethobenzamide  200 mg Intramuscular Q6H PRN Emelina Stone PA-C          Today, Patient Was Seen By: Arturo Coleman MD    ** Please Note: Dictation voice to text software may have been used in the creation of this document   **

## 2023-05-09 NOTE — PROGRESS NOTES
-- Patient:  -- MRN: [id number]  -- Aidin Request ID: [id number]  -- Level of care reserved: [address] [facility]  -- Partner Reserved: [address]  [facility] , [facility], [address] [facility] [phone]  -- Clinical needs requested:  -- Geography searched: [phone]  -- Start of Service:  -- Request sent: 9:36am EDT on 5/8/2023 by Gisella Rose  -- Partner reserved: 2:39pm EDT on 5/9/2023 by Gisella Rose  -- Choice list shared: 12:46pm EDT on 5/9/2023 by Gisella Rose

## 2023-05-09 NOTE — PLAN OF CARE
Problem: Potential for Falls  Goal: Patient will remain free of falls  Description: INTERVENTIONS:  - Educate patient/family on patient safety including physical limitations  - Instruct patient to call for assistance with activity   - Consult OT/PT to assist with strengthening/mobility   - Keep Call bell within reach  - Keep bed low and locked with side rails adjusted as appropriate  - Keep care items and personal belongings within reach  - Initiate and maintain comfort rounds  - Make Fall Risk Sign visible to staff  - Offer Toileting every 2 Hours, in advance of need  - Initiate/Maintain alarm  - Obtain necessary fall risk management equipment  - Apply yellow socks and bracelet for high fall risk patients  - Consider moving patient to room near nurses station  Outcome: Progressing     Problem: MOBILITY - ADULT  Goal: Maintain or return to baseline ADL function  Description: INTERVENTIONS:  -  Assess patient's ability to carry out ADLs; assess patient's baseline for ADL function and identify physical deficits which impact ability to perform ADLs (bathing, care of mouth/teeth, toileting, grooming, dressing, etc )  - Assess/evaluate cause of self-care deficits   - Assess range of motion  - Assess patient's mobility; develop plan if impaired  - Assess patient's need for assistive devices and provide as appropriate  - Encourage maximum independence but intervene and supervise when necessary  - Involve family in performance of ADLs  - Assess for home care needs following discharge   - Consider OT consult to assist with ADL evaluation and planning for discharge  - Provide patient education as appropriate  Outcome: Progressing  Goal: Maintains/Returns to pre admission functional level  Description: INTERVENTIONS:  - Perform BMAT or MOVE assessment daily    - Set and communicate daily mobility goal to care team and patient/family/caregiver     - Collaborate with rehabilitation services on mobility goals if consulted  - Perform Range of Motion 3 times a day  - Reposition patient every 2 hours    - Dangle patient 3 times a day  - Stand patient 3 times a day  - Ambulate patient 3 times a day  - Out of bed to chair 3 times a day   - Out of bed for meals 3 times a day  - Out of bed for toileting  - Record patient progress and toleration of activity level   Outcome: Progressing     Problem: PAIN - ADULT  Goal: Verbalizes/displays adequate comfort level or baseline comfort level  Description: Interventions:  - Encourage patient to monitor pain and request assistance  - Assess pain using appropriate pain scale  - Administer analgesics based on type and severity of pain and evaluate response  - Implement non-pharmacological measures as appropriate and evaluate response  - Consider cultural and social influences on pain and pain management  - Notify physician/advanced practitioner if interventions unsuccessful or patient reports new pain  Outcome: Progressing     Problem: INFECTION - ADULT  Goal: Absence or prevention of progression during hospitalization  Description: INTERVENTIONS:  - Assess and monitor for signs and symptoms of infection  - Monitor lab/diagnostic results  - Monitor all insertion sites, i e  indwelling lines, tubes, and drains  - Monitor endotracheal if appropriate and nasal secretions for changes in amount and color  - [place] appropriate cooling/warming therapies per order  - Administer medications as ordered  - Instruct and encourage patient and family to use good hand hygiene technique  - Identify and instruct in appropriate isolation precautions for identified infection/condition  Outcome: Progressing  Goal: Absence of fever/infection during neutropenic period  Description: INTERVENTIONS:  - Monitor WBC    Outcome: Progressing     Problem: SAFETY ADULT  Goal: Patient will remain free of falls  Description: INTERVENTIONS:  - Educate patient/family on patient safety including physical limitations  - Instruct patient to call for assistance with activity   - Consult OT/PT to assist with strengthening/mobility   - Keep Call bell within reach  - Keep bed low and locked with side rails adjusted as appropriate  - Keep care items and personal belongings within reach  - Initiate and maintain comfort rounds  - Make Fall Risk Sign visible to staff  - Offer Toileting every 2 Hours, in advance of need  - Initiate/Maintain alarm  - Obtain necessary fall risk management equipment  - Apply yellow socks and bracelet for high fall risk patients  - Consider moving patient to room near nurses station  Outcome: Progressing  Goal: Maintain or return to baseline ADL function  Description: INTERVENTIONS:  -  Assess patient's ability to carry out ADLs; assess patient's baseline for ADL function and identify physical deficits which impact ability to perform ADLs (bathing, care of mouth/teeth, toileting, grooming, dressing, etc )  - Assess/evaluate cause of self-care deficits   - Assess range of motion  - Assess patient's mobility; develop plan if impaired  - Assess patient's need for assistive devices and provide as appropriate  - Encourage maximum independence but intervene and supervise when necessary  - Involve family in performance of ADLs  - Assess for home care needs following discharge   - Consider OT consult to assist with ADL evaluation and planning for discharge  - Provide patient education as appropriate  Outcome: Progressing  Goal: Maintains/Returns to pre admission functional level  Description: INTERVENTIONS:  - Perform BMAT or MOVE assessment daily    - Set and communicate daily mobility goal to care team and patient/family/caregiver  - Collaborate with rehabilitation services on mobility goals if consulted  - Perform Range of Motion 3 times a day  - Reposition patient every 2 hours    - Dangle patient 3 times a day  - Stand patient 3 times a day  - Ambulate patient 3 times a day  - Out of bed to chair 3 times a day   - Out of bed for meals 3 times a day  - Out of bed for toileting  - Record patient progress and toleration of activity level   Outcome: Progressing     Problem: DISCHARGE PLANNING  Goal: Discharge to home or other facility with appropriate resources  Description: INTERVENTIONS:  - Identify barriers to discharge w/patient and caregiver  - Arrange for needed discharge resources and transportation as appropriate  - Identify discharge learning needs (meds, wound care, etc )  - Arrange for interpretive services to assist at discharge as needed  - Refer to Case Management Department for coordinating discharge planning if the patient needs post-hospital services based on physician/advanced practitioner order or complex needs related to functional status, cognitive ability, or social support system  Outcome: Progressing     Problem: Knowledge Deficit  Goal: Patient/family/caregiver demonstrates understanding of disease process, treatment plan, medications, and discharge instructions  Description: Complete learning assessment and assess knowledge base    Interventions:  - Provide teaching at level of understanding  - Provide teaching via preferred learning methods  Outcome: Progressing     Problem: Prexisting or High Potential for Compromised Skin Integrity  Goal: Skin integrity is maintained or improved  Description: INTERVENTIONS:  - Identify patients at risk for skin breakdown  - Assess and monitor skin integrity  - Assess and monitor nutrition and hydration status  - Monitor labs   - Assess for incontinence   - Turn and reposition patient  - Assist with mobility/ambulation  - Relieve pressure over bony prominences  - Avoid friction and shearing  - Provide appropriate hygiene as needed including keeping skin clean and dry  - Evaluate need for skin moisturizer/barrier cream  - Collaborate with interdisciplinary team   - Patient/family teaching  - Consider wound care consult   Outcome: Progressing     Problem: Nutrition/Hydration-ADULT  Goal: Nutrient/Hydration intake appropriate for improving, restoring or maintaining nutritional needs  Description: Monitor and assess patient's nutrition/hydration status for malnutrition  Collaborate with interdisciplinary team and initiate plan and interventions as ordered  Monitor patient's weight and dietary intake as ordered or per policy  Utilize nutrition screening tool and intervene as necessary  Determine patient's food preferences and provide high-protein, high-caloric foods as appropriate       INTERVENTIONS:  - Monitor oral intake, urinary output, labs, and treatment plans  - Assess nutrition and hydration status and recommend course of action  - Evaluate amount of meals eaten  - Assist patient with eating if necessary   - Allow adequate time for meals  - Recommend/ encourage appropriate diets, oral nutritional supplements, and vitamin/mineral supplements  - Order, calculate, and assess calorie counts as needed  - Recommend, monitor, and adjust tube feedings and TPN/PPN based on assessed needs  - Assess need for intravenous fluids  - Provide specific nutrition/hydration education as appropriate  - Include patient/family/caregiver in decisions related to nutrition  Outcome: Progressing

## 2023-05-09 NOTE — PLAN OF CARE
Problem: PAIN - ADULT  Goal: Verbalizes/displays adequate comfort level or baseline comfort level  Description: Interventions:  - Encourage patient to monitor pain and request assistance  - Assess pain using appropriate pain scale  - Administer analgesics based on type and severity of pain and evaluate response  - Implement non-pharmacological measures as appropriate and evaluate response  - Consider cultural and social influences on pain and pain management  - Notify physician/advanced practitioner if interventions unsuccessful or patient reports new pain  Outcome: Progressing     Problem: INFECTION - ADULT  Goal: Absence or prevention of progression during hospitalization  Description: INTERVENTIONS:  - Assess and monitor for signs and symptoms of infection  - Monitor lab/diagnostic results  - Monitor all insertion sites, i e  indwelling lines, tubes, and drains  - Monitor endotracheal if appropriate and nasal secretions for changes in amount and color  - [place] appropriate cooling/warming therapies per order  - Administer medications as ordered  - Instruct and encourage patient and family to use good hand hygiene technique  - Identify and instruct in appropriate isolation precautions for identified infection/condition  Outcome: Progressing

## 2023-05-09 NOTE — CASE MANAGEMENT
Case Management Discharge Planning Note    Patient name Alan Salvador  Location /-83 MRN [id number]  : 1950 Date 2023       Current Admission Date: 2023  Current Admission Diagnosis:Acute kidney injury superimposed on chronic kidney disease [facility])   Patient Active Problem List    Diagnosis Date Noted   • Acute kidney injury superimposed on chronic [facility] disease ([facility] ) 2023   • Hyperkalemia 2023   • Fall 2023   • Hidradenitis 2023   • DM (diabetes mellitus) ([facility] ) 2023   • Anemia 2023      LOS (days): 4  Geometric Mean LOS (GMLOS) (days):   Days to GMLOS:     OBJECTIVE:  Risk of Unplanned Readmission Score: 15 18         Current admission status: Inpatient   Preferred Pharmacy:   [address] [place] [address] [id number] [place] Te[place] [address] [address]  Phone: [phone] Fax: [phone]    Primary Care Provider: No primary care provider on file  Primary Insurance: [facility]  Secondary Insurance:     DISCHARGE DETAILS:    Discharge planning discussed with[de-identified] Pt at bedside  [place] of Choice: Yes  Comments - Freedom of Choice: CM met with pt at bedside and provided pt with pt choice list of VNA  Pt choice was [facility] Shee[facility] which was reserved on AIDIN  Pt has an anticipated 24hrs dc  Pt is aware and encouraged to seek CM for any questions or concerns  CM contacted family/caregiver?: No- see comments  Were Treatment Team discharge recommendations reviewed with patient/caregiver?: Yes  Did patient/caregiver verbalize understanding of patient care needs?: Yes  Were patient/caregiver advised of the risks associated with not following Treatment Team discharge recommendations?: Yes    Contacts  Reason/Outcome: Continuity of Care, Discharge Planning                 IMM Given (Date):: 23  IMM Given to[de-identified] Patient  Family notified[de-identified] Pt at bedside  Additional Comments: CM reviewed IMM with pt at bedside   Pt expressed full and complete understanding  Copy was provided and original placed in MR for scanning

## 2023-05-09 NOTE — ASSESSMENT & PLAN NOTE
Lab Results   Component Value Date    HGBA1C 6 1 (H) 03/23/2023       Recent Labs     05/08/23  1553 05/08/23 2057 05/09/23  0648 05/09/23  1030   POCGLU 92 83 95 84       Blood Sugar Average: Last 72 hrs:  (P) 101 04   · Controlled a/e/b A1c  · Patient is on once weekly Ozempic - hold inpatient  · Hypoglycemia in the setting of poor oral intake, long 1/2 life of ozempic  · Continue D5 infusion until oral intake improved  · Hold all insulins   · BG checks ACHS

## 2023-05-09 NOTE — ASSESSMENT & PLAN NOTE
Baseline CKD stage III; cr around 1 6-1 7 range    Creatinine now back to baseline   · Presentating cr 2 65 with hyperkalemia 5 6  · Suspected secondary to recently started Bactrim on 05/01/2023 for mastitis  · Likely also secondary to poor oral intake  · Has since improved with IV fluids  · Monitor renal function and if remains stable tomorrow then will discharge tomorrow am

## 2023-05-10 VITALS
DIASTOLIC BLOOD PRESSURE: 81 MMHG | HEART RATE: 101 BPM | RESPIRATION RATE: 17 BRPM | HEIGHT: 65 IN | TEMPERATURE: 97.7 F | SYSTOLIC BLOOD PRESSURE: 136 MMHG | WEIGHT: 257 LBS | BODY MASS INDEX: 42.82 KG/M2 | OXYGEN SATURATION: 96 %

## 2023-05-10 PROBLEM — E66.01 MORBID OBESITY (HCC): Status: ACTIVE | Noted: 2023-05-10

## 2023-05-10 LAB
ANION GAP SERPL CALCULATED.3IONS-SCNC: 4 MMOL/L (ref 4–13)
BUN SERPL-MCNC: 25 MG/DL (ref 5–25)
CALCIUM SERPL-MCNC: 9.4 MG/DL (ref 8.4–10.2)
CHLORIDE SERPL-SCNC: 106 MMOL/L (ref 96–108)
CO2 SERPL-SCNC: 26 MMOL/L (ref 21–32)
CREAT SERPL-MCNC: 1.76 MG/DL (ref 0.6–1.3)
GFR SERPL CREATININE-BSD FRML MDRD: 28 ML/MIN/1.73SQ M
GLUCOSE SERPL-MCNC: 107 MG/DL (ref 65–140)
GLUCOSE SERPL-MCNC: 84 MG/DL (ref 65–140)
GLUCOSE SERPL-MCNC: 84 MG/DL (ref 65–140)
POTASSIUM SERPL-SCNC: 5.1 MMOL/L (ref 3.5–5.3)
SODIUM SERPL-SCNC: 136 MMOL/L (ref 135–147)

## 2023-05-10 PROCEDURE — 82948 REAGENT STRIP/BLOOD GLUCOSE: CPT

## 2023-05-10 PROCEDURE — 99239 HOSP IP/OBS DSCHRG MGMT >30: CPT | Performed by: INTERNAL MEDICINE

## 2023-05-10 PROCEDURE — 94660 CPAP INITIATION&MGMT: CPT

## 2023-05-10 PROCEDURE — 80048 BASIC METABOLIC PNL TOTAL CA: CPT | Performed by: INTERNAL MEDICINE

## 2023-05-10 PROCEDURE — 94760 N-INVAS EAR/PLS OXIMETRY 1: CPT

## 2023-05-10 RX ADMIN — DULOXETINE HYDROCHLORIDE 60 MG: 60 CAPSULE, DELAYED RELEASE ORAL at 08:36

## 2023-05-10 RX ADMIN — ASPIRIN 81 MG: 81 TABLET, CHEWABLE ORAL at 08:36

## 2023-05-10 RX ADMIN — HEPARIN SODIUM 5000 UNITS: 5000 INJECTION INTRAVENOUS; SUBCUTANEOUS at 05:40

## 2023-05-10 RX ADMIN — TRAMADOL HYDROCHLORIDE 100 MG: 50 TABLET, COATED ORAL at 07:12

## 2023-05-10 RX ADMIN — TIMOLOL MALEATE 1 DROP: 5 SOLUTION/ DROPS OPHTHALMIC at 08:37

## 2023-05-10 NOTE — CASE MANAGEMENT
Case Management Discharge Planning Note    Patient name Manfred Vergara  Location /-26 MRN [id number]  : 1950 Date 5/10/2023       Current Admission Date: 2023  Current Admission Diagnosis:Acute kidney injury superimposed on chronic kidney disease [facility])   Patient Active Problem List    Diagnosis Date Noted   • Morbid obesity ([address] ) 05/10/2023   • Acute kidney injury superimposed on chronic kidney disease ([facility] ) 2023   • Hyperkalemia 2023   • Fall 2023   • Hidradenitis 2023   • DM (diabetes mellitus) ([facility] ) 2023   • Anemia 2023      LOS (days): 5  Geometric Mean LOS (GMLOS) (days): 2 20  Days to GMLOS:-2 7     OBJECTIVE:  Risk of Unplanned Readmission Score: 15 41         Current admission status: Inpatient   Preferred Pharmacy:   [address] [place] [address] #[id number] [facility] [address] [facility] [address]  Phone: [phone] Fax: [phone]    Primary Care Provider: No primary care provider on file  Primary Insurance: [facility]  Secondary Insurance:     DISCHARGE DETAILS:  Pt is being dc home today  At dc, pt expressed to RN and CM that she did not have a ride home  CM asked pt if she is able to pay for a lyft in which pt stated that she did not have any money  [employer] of CM made aware and Lyft waiver was reviewed and sign with pt at bedside  CM arranged lyft transport via roundtrip for a 12:45pm

## 2023-05-10 NOTE — PLAN OF CARE
----- Message from Nevin Foy sent at 7/19/2017  8:06 AM CDT -----  Contact: Self  Pt states her stomaching is cramping due to Mirena. Would like to be advised how to handle. Pt can be reached @ [phone].  ---------------------------------------------------  07/19/17 @ 0933A ([facility])   CALL ATTEMPT TO [place] [facility] UNSUCCESSFUL, CALLED HOME # [phone] & WORK # [phone] BOTH #'S DID NOT HAVE A VOICE MAIL SET UP TO LEAVE A MESSAGE   Problem: Potential for Falls  Goal: Patient will remain free of falls  Description: INTERVENTIONS:  - Educate patient/family on patient safety including physical limitations  - Instruct patient to call for assistance with activity   - Consult OT/PT to assist with strengthening/mobility   - Keep Call bell within reach  - Keep bed low and locked with side rails adjusted as appropriate  - Keep care items and personal belongings within reach  - Initiate and maintain comfort rounds  - Make Fall Risk Sign visible to staff  - Offer Toileting every 2 Hours, in advance of need  - Initiate/Maintain alarm  - Obtain necessary fall risk management equipment:  - Apply yellow socks and bracelet for high fall risk patients  - Consider moving patient to room near nurses station  5/10/2023 1307 by Vivien Siegel RN  Outcome: Adequate for Discharge  5/10/2023 1302 by Vivien Siegel RN  Outcome: Progressing     Problem: MOBILITY - ADULT  Goal: Maintain or return to baseline ADL function  Description: INTERVENTIONS:  -  Assess patient's ability to carry out ADLs; assess patient's baseline for ADL function and identify physical deficits which impact ability to perform ADLs (bathing, care of mouth/teeth, toileting, grooming, dressing, etc )  - Assess/evaluate cause of self-care deficits   - Assess range of motion  - Assess patient's mobility; develop plan if impaired  - Assess patient's need for assistive devices and provide as appropriate  - Encourage maximum independence but intervene and supervise when necessary  - Involve family in performance of ADLs  - Assess for home care needs following discharge   - Consider OT consult to assist with ADL evaluation and planning for discharge  - Provide patient education as appropriate  5/10/2023 1307 by Vivien Siegel RN  Outcome: Adequate for Discharge  5/10/2023 1302 by Vivien Siegel RN  Outcome: Progressing  Goal: Maintains/Returns to pre admission functional level  Description: INTERVENTIONS:  - Perform BMAT or MOVE assessment daily    - Set and communicate daily mobility goal to care team and patient/family/caregiver  - Collaborate with rehabilitation services on mobility goals if consulted  - Perform Range of Motion 3 times a day  - Reposition patient every 2 hours    - Dangle patient 3 times a day  - Stand patient 3 times a day  - Ambulate patient 3 times a day  - Out of bed to chair 3 times a day   - Out of bed for meals 3 times a day  - Out of bed for toileting  - Record patient progress and toleration of activity level   5/10/2023 1307 by Cristofer Avila RN  Outcome: Adequate for Discharge  5/10/2023 1302 by Cristofer Avila RN  Outcome: Progressing     Problem: PAIN - ADULT  Goal: Verbalizes/displays adequate comfort level or baseline comfort level  Description: Interventions:  - Encourage patient to monitor pain and request assistance  - Assess pain using appropriate pain scale  - Administer analgesics based on type and severity of pain and evaluate response  - Implement non-pharmacological measures as appropriate and evaluate response  - Consider cultural and social influences on pain and pain management  - Notify physician/advanced practitioner if interventions unsuccessful or patient reports new pain  5/10/2023 1307 by Cristofer Avila RN  Outcome: Adequate for Discharge  5/10/2023 1302 by Cristofer Avila RN  Outcome: Progressing     Problem: INFECTION - ADULT  Goal: Absence or prevention of progression during hospitalization  Description: INTERVENTIONS:  - Assess and monitor for signs and symptoms of infection  - Monitor lab/diagnostic results  - Monitor all insertion sites, i e  indwelling lines, tubes, and drains  - Monitor endotracheal if appropriate and nasal secretions for changes in amount and color  - [place] appropriate cooling/warming therapies per order  - Administer medications as ordered  - Instruct and encourage patient and family to use good hand hygiene technique  - Identify and instruct in appropriate isolation precautions for identified infection/condition  5/10/2023 1307 by Nena Reed RN  Outcome: Adequate for Discharge  5/10/2023 1302 by Nena Reed RN  Outcome: Progressing  Goal: Absence of fever/infection during neutropenic period  Description: INTERVENTIONS:  - Monitor WBC    5/10/2023 1307 by Nena Reed RN  Outcome: Adequate for Discharge  5/10/2023 1302 by Nena Reed RN  Outcome: Progressing     Problem: SAFETY ADULT  Goal: Patient will remain free of falls  Description: INTERVENTIONS:  - Educate patient/family on patient safety including physical limitations  - Instruct patient to call for assistance with activity   - Consult OT/PT to assist with strengthening/mobility   - Keep Call bell within reach  - Keep bed low and locked with side rails adjusted as appropriate  - Keep care items and personal belongings within reach  - Initiate and maintain comfort rounds  - Make Fall Risk Sign visible to staff  - Offer Toileting every 2 Hours, in advance of need  - Initiate/Maintain alarm  - Obtain necessary fall risk management equipment  - Apply yellow socks and bracelet for high fall risk patients  - Consider moving patient to room near nurses station  5/10/2023 1307 by Nena Reed RN  Outcome: Adequate for Discharge  5/10/2023 1302 by Nena Reed RN  Outcome: Progressing  Goal: Maintain or return to baseline ADL function  Description: INTERVENTIONS:  -  Assess patient's ability to carry out ADLs; assess patient's baseline for ADL function and identify physical deficits which impact ability to perform ADLs (bathing, care of mouth/teeth, toileting, grooming, dressing, etc )  - Assess/evaluate cause of self-care deficits   - Assess range of motion  - Assess patient's mobility; develop plan if impaired  - Assess patient's need for assistive devices and provide as appropriate  - Encourage maximum independence but intervene and supervise when necessary  - Involve family in performance of ADLs  - Assess for home care needs following discharge   - Consider OT consult to assist with ADL evaluation and planning for discharge  - Provide patient education as appropriate  5/10/2023 1307 by Jay Kelly RN  Outcome: Adequate for Discharge  5/10/2023 1302 by Jay Kelly RN  Outcome: Progressing  Goal: Maintains/Returns to pre admission functional level  Description: INTERVENTIONS:  - Perform BMAT or MOVE assessment daily    - Set and communicate daily mobility goal to care team and patient/family/caregiver  - Collaborate with rehabilitation services on mobility goals if consulted  - Perform Range of Motion 3 times a day  - Reposition patient every 2 hours    - Dangle patient 3 times a day  - Stand patient 3 times a day  - Ambulate patient 3 times a day  - Out of bed to chair 3 times a day   - Out of bed for meals 3 times a day  - Out of bed for toileting  - Record patient progress and toleration of activity level   5/10/2023 1307 by Jay Kelly RN  Outcome: Adequate for Discharge  5/10/2023 1302 by Jay Kelly RN  Outcome: Progressing     Problem: DISCHARGE PLANNING  Goal: Discharge to home or other facility with appropriate resources  Description: INTERVENTIONS:  - Identify barriers to discharge w/patient and caregiver  - Arrange for needed discharge resources and transportation as appropriate  - Identify discharge learning needs (meds, wound care, etc )  - Arrange for interpretive services to assist at discharge as needed  - Refer to Case Management Department for coordinating discharge planning if the patient needs post-hospital services based on physician/advanced practitioner order or complex needs related to functional status, cognitive ability, or social support system  5/10/2023 1307 by Elio SANJAY Cuevas  Outcome: Adequate for Discharge  5/10/2023 1302 by Lester Weber RN  Outcome: Progressing     Problem: Knowledge Deficit  Goal: Patient/family/caregiver demonstrates understanding of disease process, treatment plan, medications, and discharge instructions  Description: Complete learning assessment and assess knowledge base  Interventions:  - Provide teaching at level of understanding  - Provide teaching via preferred learning methods  5/10/2023 1307 by Lester Weber RN  Outcome: Adequate for Discharge  5/10/2023 1302 by Lester Weber RN  Outcome: Progressing     Problem: Prexisting or High Potential for Compromised Skin Integrity  Goal: Skin integrity is maintained or improved  Description: INTERVENTIONS:  - Identify patients at risk for skin breakdown  - Assess and monitor skin integrity  - Assess and monitor nutrition and hydration status  - Monitor labs   - Assess for incontinence   - Turn and reposition patient  - Assist with mobility/ambulation  - Relieve pressure over bony prominences  - Avoid friction and shearing  - Provide appropriate hygiene as needed including keeping skin clean and dry  - Evaluate need for skin moisturizer/barrier cream  - Collaborate with interdisciplinary team   - Patient/family teaching  - Consider wound care consult   5/10/2023 1307 by Lester Weber RN  Outcome: Adequate for Discharge  5/10/2023 1302 by Lester Weber RN  Outcome: Progressing     Problem: Nutrition/Hydration-ADULT  Goal: Nutrient/Hydration intake appropriate for improving, restoring or maintaining nutritional needs  Description: Monitor and assess patient's nutrition/hydration status for malnutrition  Collaborate with interdisciplinary team and initiate plan and interventions as ordered  Monitor patient's weight and dietary intake as ordered or per policy  Utilize nutrition screening tool and intervene as necessary   Determine patient's food preferences and provide high-protein, high-caloric foods as appropriate       INTERVENTIONS:  - Monitor oral intake, urinary output, labs, and treatment plans  - Assess nutrition and hydration status and recommend course of action  - Evaluate amount of meals eaten  - Assist patient with eating if necessary   - Allow adequate time for meals  - Recommend/ encourage appropriate diets, oral nutritional supplements, and vitamin/mineral supplements  - Order, calculate, and assess calorie counts as needed  - Recommend, monitor, and adjust tube feedings and TPN/PPN based on assessed needs  - Assess need for intravenous fluids  - Provide specific nutrition/hydration education as appropriate  - Include patient/family/caregiver in decisions related to nutrition  5/10/2023 1307 by Lester Weber RN  Outcome: Adequate for Discharge  5/10/2023 1302 by Lester Weber RN  Outcome: Progressing

## 2023-05-10 NOTE — ASSESSMENT & PLAN NOTE
Lab Results   Component Value Date    HGBA1C 6 1 (H) 03/23/2023       Recent Labs     05/09/23  1030 05/09/23  1545 05/09/23  2100 05/10/23  0702   POCGLU 84 94 82 84       Blood Sugar Average: Last 72 hrs:  (P) 108 875   · Controlled a/e/b A1c  · Patient is on once weekly Ozempic - hold inpatient  · Hypoglycemia in the setting of poor oral intake, long 1/2 life of ozempic  · Hold all insulins   · BG checks ACHS

## 2023-05-10 NOTE — RESPIRATORY THERAPY NOTE
05/10/23 0000   Respiratory Assessment   Assessment Type Assess only   General Appearance Awake   Respiratory Pattern Normal   Chest Assessment Chest expansion symmetrical   Bilateral Breath Sounds Clear   R Breath Sounds Clear   L Breath Sounds Clear   Location Specific No   Cough None   Resp Comments Pt placed on cpap at this time   Non-Invasive Information   O2 Interface Device Face mask   Non-Invasive Ventilation Mode CPAP   $ Intermittent NIV Yes   SpO2 96 %   $ Pulse Oximetry Spot Check Charge Completed   Non-Invasive Settings   FiO2 (%) 21   PEEP/CPAP (cm H2O) 10   Rise Time 2   Non-Invasive Readings   Skin Intervention Skin intact   Total Rate 17   Spontaneous Vt (mL) 357   Spontaneous MV (mL) 6   Leak (lpm) 20   Non-Invasive Alarms   Low Insp Pressure Time (sec) 60 sec   MV Low (L/min) 2   High Resp Rate (BPM) 40 BPM   Apnea Interval (sec) 30     RT Ventilator Management Note  Joel Coelho 81 y o  female MRN: [id number]  Unit/Bed#: -01 Encounter: [id number]      Daily Screen    No data found in the last 10 encounters             Physical Exam:   Assessment Type: Assess only  General Appearance: Awake  Respiratory Pattern: Normal  Chest Assessment: Chest expansion symmetrical  Bilateral Breath Sounds: Clear  R Breath Sounds: Clear  L Breath Sounds: Clear  Location Specific: No  Cough: None      Resp Comments: Pt placed on cpap at this time

## 2023-05-10 NOTE — ASSESSMENT & PLAN NOTE
due to excess calories, as evidenced by BMI of 42 77, requiring nutrition assessment by Registered Dietitian

## 2023-05-10 NOTE — RESPIRATORY THERAPY NOTE
05/10/23 0400   Respiratory Assessment   Assessment Type Assess only   General Appearance Sleeping   Respiratory Pattern Normal   Chest Assessment Chest expansion symmetrical   Bilateral Breath Sounds Clear   R Breath Sounds Clear   L Breath Sounds Clear   Location Specific No   Cough None   Resp Comments Pt remains on cpap   O2 Device V30   Non-Invasive Information   O2 Interface Device Face mask   Non-Invasive Ventilation Mode CPAP   SpO2 96 %   $ Pulse Oximetry Spot Check Charge Completed   Non-Invasive Settings   FiO2 (%) 21   PEEP/CPAP (cm H2O) 10   Rise Time 2   Non-Invasive Readings   Skin Intervention Skin intact   Total Rate 16   Spontaneous Vt (mL) 310   Spontaneous MV (mL) 8   Leak (lpm) 25   Non-Invasive Alarms   Low Insp Pressure Time (sec) 60 sec   MV Low (L/min) 2   High Resp Rate (BPM) 40 BPM   Apnea Interval (sec) 30     RT Ventilator Management Note  Jeremias Machuca 81 y o  female MRN: [id number]  Unit/Bed#: -01 Encounter: [id number]      Daily Screen    No data found in the last 10 encounters             Physical Exam:   Assessment Type: Assess only  General Appearance: Sleeping  Respiratory Pattern: Normal  Chest Assessment: Chest expansion symmetrical  Bilateral Breath Sounds: Clear  R Breath Sounds: Clear  L Breath Sounds: Clear  Location Specific: No  Cough: None  O2 Device: V30      Resp Comments: Pt remains on cpap

## 2023-05-10 NOTE — DISCHARGE SUMMARY
[address] [facility]  Discharge- Compa Signs 1950, 81 y o  female MRN: [id number]  Unit/Bed#: -Jj Encounter: [id number]  Primary Care Provider: No primary care provider on file  Date and time admitted to hospital: 5/5/2023 12:24 PM    * Acute kidney injury superimposed on chronic kidney disease ([facility] )  Assessment & Plan  Baseline CKD stage III; cr around 1 6-1 7 range  Creatinine now back to baseline   · Presentating cr 2 65 with hyperkalemia 5 6  · Suspected secondary to recently started Bactrim on 05/01/2023 for mastitis  · Likely also secondary to poor oral intake  · Has since improved with IV fluids  · Monitor renal function and if remains stable tomorrow then will discharge tomorrow am     Morbid obesity ([facility] )  Assessment & Plan  due to excess calories, as evidenced by BMI of 42 77, requiring nutrition assessment by Registered Dietitian  Anemia  Assessment & Plan  · Noted with anemia w/o signs of active overt bleeding repeat   · EGD in 01/2023 noted with hiatal hernia, nonobstructive Schatzki's ring, erosive gastritis  · Colonoscopy 01/2023 report noted with diverticulosis, small 2 to 5 mm polyp was removed at the time    · Current hemoglobin stable 10s, suspect some hemodilution given IVF  · Iron panel noted, suspect secondary to CKD    DM (diabetes mellitus) ([facility] )  Assessment & Plan  Lab Results   Component Value Date    HGBA1C 6 1 (H) 03/23/2023       Recent Labs     05/09/23  1030 05/09/23  1545 05/09/23  2100 05/10/23  0702   POCGLU 84 94 82 84       Blood Sugar Average: Last 72 hrs:  (P) 108 875   · Controlled a/e/b A1c  · Patient is on once weekly Ozempic - hold inpatient  · Hypoglycemia in the setting of poor oral intake, long 1/2 life of ozempic  · Hold all insulins   · BG checks ACHS    Hidradenitis  Assessment & Plan  · Patient has history of left breast hidradenitis  · Patient was started on Bactrim by PCP on 05/01/2023  · Presented with acute kidney injury, hyperkalemia likely due to Bactrim  · Noted no signs of infection on admission exam  · Stop Bactrim  · Follow-up with wound care consult  Fall  Assessment & Plan  Presents status post 2 falls, losing her balance  Patient reports head strike without loss of consciousness  Question of slurred speech at times along with report of difficulty with memory lately    · CT head - negative  · Fall precautions, PT and OT recs for Jey Gavin noted  · Labs do reveal elevated TSH, mild leukocytosis, LEOLA, hyperkalemia  · See individual treatment plans below    Hyperkalemia  Assessment & Plan  · Presented with hyperkalemia in setting of acute kidney injury  · EKG noted with normal sinus rhythm without acute changes  · Currently resolved, will continue to monitor       Discharging Physician / Practitioner: Dwayne Cantrell MD  PCP: No primary care provider on file  Admission Date:   Admission Orders (From admission, onward)     Ordered        05/05/23 1445  1 [facility]  Once                      Discharge Date: 05/10/23    Medical Problems     Resolved Problems  Date Reviewed: 5/10/2023   None             Reason for Admission: Generalized weakness    Hospital Course:     Lidia Mendez is a 81 y o  female patient who originally presented to the hospital on 5/5/2023 due to generalized weakness, LEOLA and hyperkalemia thought to be secondary to recent Bactrim use  Renal function and potassium improved with stopping Bactrim and IV fluid hydration  Home PT service was set up by case management  Patient is medically stable for discharge  She was advised to follow-up with primary care doctor with follow-up BMP outpatient        Please see above list of diagnoses and related plan for additional information       Condition at Discharge: stable     Discharge Day Visit / Exam:     Subjective: Patient denies any complaints  Vitals: Blood Pressure: 136/81 (05/10/23 0717)  Pulse: 101 (05/10/23 0717)  Temperature: 97 7 °F "(36 5 °C) (05/10/23 0717)  Temp Source: Oral (05/07/23 0255)  Respirations: 17 (05/10/23 0717)  Height: 5' 5\" (165 1 cm) (05/05/23 1222)  Weight - Scale: 117 kg (257 lb) (05/05/23 1222)  SpO2: 96 % (05/10/23 0717)  Exam:   Physical Exam  Vitals and nursing note reviewed  Constitutional:       General: She is not in acute distress  Appearance: She is well-developed  She is not ill-appearing or diaphoretic  HENT:      Head: Normocephalic and atraumatic  Mouth/Throat:      Mouth: Mucous membranes are moist       Pharynx: Oropharynx is clear  Eyes:      Conjunctiva/sclera: Conjunctivae normal    Cardiovascular:      Rate and Rhythm: Normal rate and regular rhythm  Heart sounds: No murmur heard  Pulmonary:      Effort: Pulmonary effort is normal  No respiratory distress  Breath sounds: Normal breath sounds  No stridor  No wheezing or rales  Abdominal:      General: Bowel sounds are normal       Palpations: Abdomen is soft  Tenderness: There is no abdominal tenderness  Musculoskeletal:         General: No swelling  Cervical back: Normal range of motion and neck supple  Right lower leg: No edema  Left lower leg: No edema  Skin:     General: Skin is warm and dry  Capillary Refill: Capillary refill takes less than 2 seconds  Neurological:      General: No focal deficit present  Mental Status: She is alert and oriented to person, place, and time  Psychiatric:         Mood and Affect: Mood normal          Behavior: Behavior normal          Discussion with Family: Patient declined    Discharge instructions/Information to patient and family:   See after visit summary for information provided to patient and family  Provisions for Follow-Up Care:  See after visit summary for information related to follow-up care and any pertinent home health orders        Disposition:     Home with VNA Services (Reminder: Complete face to face encounter)        Planned " Readmission: no     Discharge Statement:  I spent 40 minutes discharging the patient  This time was spent on the day of discharge  I had direct contact with the patient on the day of discharge  Greater than 50% of the total time was spent examining patient, answering all patient questions, arranging and discussing plan of care with patient as well as directly providing post-discharge instructions  Additional time then spent on discharge activities  Discharge Medications:  See after visit summary for reconciled discharge medications provided to patient and family        ** Please Note: This note has been constructed using a voice recognition system **

## 2023-05-10 NOTE — PLAN OF CARE
Problem: Potential for Falls  Goal: Patient will remain free of falls  Description: INTERVENTIONS:  - Educate patient/family on patient safety including physical limitations  - Instruct patient to call for assistance with activity   - Consult OT/PT to assist with strengthening/mobility   - Keep Call bell within reach  - Keep bed low and locked with side rails adjusted as appropriate  - Keep care items and personal belongings within reach  - Initiate and maintain comfort rounds  - Make Fall Risk Sign visible to staff  - Offer Toileting every 2 Hours, in advance of need  - Initiate/Maintain alarm  - Obtain necessary fall risk management equipment:   - Apply yellow socks and bracelet for high fall risk patients  - Consider moving patient to room near nurses station  Outcome: Progressing     Problem: MOBILITY - ADULT  Goal: Maintain or return to baseline ADL function  Description: INTERVENTIONS:  -  Assess patient's ability to carry out ADLs; assess patient's baseline for ADL function and identify physical deficits which impact ability to perform ADLs (bathing, care of mouth/teeth, toileting, grooming, dressing, etc )  - Assess/evaluate cause of self-care deficits   - Assess range of motion  - Assess patient's mobility; develop plan if impaired  - Assess patient's need for assistive devices and provide as appropriate  - Encourage maximum independence but intervene and supervise when necessary  - Involve family in performance of ADLs  - Assess for home care needs following discharge   - Consider OT consult to assist with ADL evaluation and planning for discharge  - Provide patient education as appropriate  Outcome: Progressing  Goal: Maintains/Returns to pre admission functional level  Description: INTERVENTIONS:  - Perform BMAT or MOVE assessment daily    - Set and communicate daily mobility goal to care team and patient/family/caregiver     - Collaborate with rehabilitation services on mobility goals if consulted  - Perform Range of Motion 3 times a day  - Reposition patient every 2 hours    - Dangle patient 3 times a day  - Stand patient 3 times a day  - Ambulate patient 3 times a day  - Out of bed to chair 3 times a day   - Out of bed for meals 3 times a day  - Out of bed for toileting  - Record patient progress and toleration of activity level   Outcome: Progressing     Problem: PAIN - ADULT  Goal: Verbalizes/displays adequate comfort level or baseline comfort level  Description: Interventions:  - Encourage patient to monitor pain and request assistance  - Assess pain using appropriate pain scale  - Administer analgesics based on type and severity of pain and evaluate response  - Implement non-pharmacological measures as appropriate and evaluate response  - Consider cultural and social influences on pain and pain management  - Notify physician/advanced practitioner if interventions unsuccessful or patient reports new pain  Outcome: Progressing     Problem: INFECTION - ADULT  Goal: Absence or prevention of progression during hospitalization  Description: INTERVENTIONS:  - Assess and monitor for signs and symptoms of infection  - Monitor lab/diagnostic results  - Monitor all insertion sites, i e  indwelling lines, tubes, and drains  - Monitor endotracheal if appropriate and nasal secretions for changes in amount and color  - [place] appropriate cooling/warming therapies per order  - Administer medications as ordered  - Instruct and encourage patient and family to use good hand hygiene technique  - Identify and instruct in appropriate isolation precautions for identified infection/condition  Outcome: Progressing  Goal: Absence of fever/infection during neutropenic period  Description: INTERVENTIONS:  - Monitor WBC    Outcome: Progressing     Problem: SAFETY ADULT  Goal: Patient will remain free of falls  Description: INTERVENTIONS:  - Educate patient/family on patient safety including physical limitations  - Instruct patient to call for assistance with activity   - Consult OT/PT to assist with strengthening/mobility   - Keep Call bell within reach  - Keep bed low and locked with side rails adjusted as appropriate  - Keep care items and personal belongings within reach  - Initiate and maintain comfort rounds  - Make Fall Risk Sign visible to staff  - Offer Toileting every 2 Hours, in advance of need  - Initiate/Maintain alarm  - Obtain necessary fall risk management equipment  - Apply yellow socks and bracelet for high fall risk patients  - Consider moving patient to room near nurses station  Outcome: Progressing  Goal: Maintain or return to baseline ADL function  Description: INTERVENTIONS:  -  Assess patient's ability to carry out ADLs; assess patient's baseline for ADL function and identify physical deficits which impact ability to perform ADLs (bathing, care of mouth/teeth, toileting, grooming, dressing, etc )  - Assess/evaluate cause of self-care deficits   - Assess range of motion  - Assess patient's mobility; develop plan if impaired  - Assess patient's need for assistive devices and provide as appropriate  - Encourage maximum independence but intervene and supervise when necessary  - Involve family in performance of ADLs  - Assess for home care needs following discharge   - Consider OT consult to assist with ADL evaluation and planning for discharge  - Provide patient education as appropriate  Outcome: Progressing  Goal: Maintains/Returns to pre admission functional level  Description: INTERVENTIONS:  - Perform BMAT or MOVE assessment daily    - Set and communicate daily mobility goal to care team and patient/family/caregiver  - Collaborate with rehabilitation services on mobility goals if consulted  - Perform Range of Motion 3 times a day  - Reposition patient every 2 hours    - Dangle patient 3 times a day  - Stand patient 3 times a day  - Ambulate patient 3 times a day  - Out of bed to chair 3 times a day   - Out of bed for meals 3 times a day  - Out of bed for toileting  - Record patient progress and toleration of activity level   Outcome: Progressing     Problem: DISCHARGE PLANNING  Goal: Discharge to home or other facility with appropriate resources  Description: INTERVENTIONS:  - Identify barriers to discharge w/patient and caregiver  - Arrange for needed discharge resources and transportation as appropriate  - Identify discharge learning needs (meds, wound care, etc )  - Arrange for interpretive services to assist at discharge as needed  - Refer to Case Management Department for coordinating discharge planning if the patient needs post-hospital services based on physician/advanced practitioner order or complex needs related to functional status, cognitive ability, or social support system  Outcome: Progressing     Problem: Knowledge Deficit  Goal: Patient/family/caregiver demonstrates understanding of disease process, treatment plan, medications, and discharge instructions  Description: Complete learning assessment and assess knowledge base    Interventions:  - Provide teaching at level of understanding  - Provide teaching via preferred learning methods  Outcome: Progressing     Problem: Prexisting or High Potential for Compromised Skin Integrity  Goal: Skin integrity is maintained or improved  Description: INTERVENTIONS:  - Identify patients at risk for skin breakdown  - Assess and monitor skin integrity  - Assess and monitor nutrition and hydration status  - Monitor labs   - Assess for incontinence   - Turn and reposition patient  - Assist with mobility/ambulation  - Relieve pressure over bony prominences  - Avoid friction and shearing  - Provide appropriate hygiene as needed including keeping skin clean and dry  - Evaluate need for skin moisturizer/barrier cream  - Collaborate with interdisciplinary team   - Patient/family teaching  - Consider wound care consult   Outcome: Progressing     Problem: Nutrition/Hydration-ADULT  Goal: Nutrient/Hydration intake appropriate for improving, restoring or maintaining nutritional needs  Description: Monitor and assess patient's nutrition/hydration status for malnutrition  Collaborate with interdisciplinary team and initiate plan and interventions as ordered  Monitor patient's weight and dietary intake as ordered or per policy  Utilize nutrition screening tool and intervene as necessary  Determine patient's food preferences and provide high-protein, high-caloric foods as appropriate       INTERVENTIONS:  - Monitor oral intake, urinary output, labs, and treatment plans  - Assess nutrition and hydration status and recommend course of action  - Evaluate amount of meals eaten  - Assist patient with eating if necessary   - Allow adequate time for meals  - Recommend/ encourage appropriate diets, oral nutritional supplements, and vitamin/mineral supplements  - Order, calculate, and assess calorie counts as needed  - Recommend, monitor, and adjust tube feedings and TPN/PPN based on assessed needs  - Assess need for intravenous fluids  - Provide specific nutrition/hydration education as appropriate  - Include patient/family/caregiver in decisions related to nutrition  Outcome: Progressing

## 2023-05-11 NOTE — UTILIZATION REVIEW
NOTIFICATION OF ADMISSION DISCHARGE   This is a Notification of Discharge from [address]  Please be advised that this patient has been discharge from our facility  Below you will find the admission and discharge date and time including the patient’s disposition  UTILIZATION REVIEW CONTACT:  Argenis Miller  Utilization   Network Utilization Review Department  Phone: [phone] x carefully listen to the prompts  All voicemails are confidential   Email: [email or web address]     ADMISSION INFORMATION  PRESENTATION DATE: 5/5/2023 12:24 PM  OBERVATION ADMISSION DATE:   INPATIENT ADMISSION DATE: 5/5/23  2:45 PM   DISCHARGE DATE: 5/10/2023  1:08 PM   DISPOSITION:Home/Self Care    IMPORTANT INFORMATION:  Send all requests for admission clinical reviews, approved or denied determinations and any other requests to dedicated fax number below belonging to the campus where the patient is receiving treatment   List of dedicated fax numbers:  [address] DENIALS (Administrative/Medical Necessity) [phone]   [address] (Maternity/NICU/Pediatrics) [phone]   [facility] [phone]   [place] [address] [phone]   [address] [phone]   [address] [facility] [phone] 90 [facility] [phone]   [address] [place] [address]bear[facility] 119 [phone]   [facility]  [phone]   [address] [facility] [phone]   412 [facility] 850 [facility] [phone]

## 2023-05-17 ENCOUNTER — APPOINTMENT (OUTPATIENT)
Dept: LAB | Facility: CLINIC | Age: 73
End: 2023-05-17

## 2023-05-17 DIAGNOSIS — E87.5 HYPERKALEMIA: ICD-10-CM

## 2023-05-17 DIAGNOSIS — N17.9 ACUTE KIDNEY INJURY SUPERIMPOSED ON CHRONIC KIDNEY DISEASE (HCC): ICD-10-CM

## 2023-05-17 DIAGNOSIS — N18.9 ACUTE KIDNEY INJURY SUPERIMPOSED ON CHRONIC KIDNEY DISEASE (HCC): ICD-10-CM

## 2023-05-17 LAB
ANION GAP SERPL CALCULATED.3IONS-SCNC: 0 MMOL/L (ref 4–13)
BUN SERPL-MCNC: 23 MG/DL (ref 5–25)
CALCIUM SERPL-MCNC: 9.9 MG/DL (ref 8.3–10.1)
CHLORIDE SERPL-SCNC: 109 MMOL/L (ref 96–108)
CO2 SERPL-SCNC: 27 MMOL/L (ref 21–32)
CREAT SERPL-MCNC: 2.16 MG/DL (ref 0.6–1.3)
GFR SERPL CREATININE-BSD FRML MDRD: 22 ML/MIN/1.73SQ M
GLUCOSE SERPL-MCNC: 103 MG/DL (ref 65–140)
POTASSIUM SERPL-SCNC: 4.6 MMOL/L (ref 3.5–5.3)
SODIUM SERPL-SCNC: 136 MMOL/L (ref 135–147)
